# Patient Record
Sex: MALE | Race: WHITE | Employment: OTHER | URBAN - METROPOLITAN AREA
[De-identification: names, ages, dates, MRNs, and addresses within clinical notes are randomized per-mention and may not be internally consistent; named-entity substitution may affect disease eponyms.]

---

## 2017-01-01 ENCOUNTER — APPOINTMENT (OUTPATIENT)
Dept: GENERAL RADIOLOGY | Age: 77
End: 2017-01-01
Attending: EMERGENCY MEDICINE
Payer: MEDICARE

## 2017-01-01 ENCOUNTER — HOSPITAL ENCOUNTER (OUTPATIENT)
Dept: LAB | Age: 77
Discharge: HOME | End: 2017-09-14

## 2017-01-01 ENCOUNTER — HOME CARE VISIT (OUTPATIENT)
Dept: SCHEDULING | Facility: HOME HEALTH | Age: 77
End: 2017-01-01
Payer: MEDICARE

## 2017-01-01 ENCOUNTER — HOME HEALTH ADMISSION (OUTPATIENT)
Dept: HOME HEALTH SERVICES | Facility: HOME HEALTH | Age: 77
End: 2017-01-01
Payer: MEDICARE

## 2017-01-01 ENCOUNTER — HOSPITAL ENCOUNTER (OUTPATIENT)
Dept: WOUND CARE | Age: 77
Discharge: HOME | End: 2017-10-09
Payer: COMMERCIAL

## 2017-01-01 ENCOUNTER — HOSPITAL ENCOUNTER (EMERGENCY)
Age: 77
End: 2017-12-15
Attending: EMERGENCY MEDICINE
Payer: MEDICARE

## 2017-01-01 ENCOUNTER — HOSPITAL ENCOUNTER (EMERGENCY)
Age: 77
Discharge: HOME | End: 2017-12-13
Attending: EMERGENCY MEDICINE
Payer: MEDICARE

## 2017-01-01 ENCOUNTER — CLINICAL SUPPORT (OUTPATIENT)
Dept: CARDIOLOGY CLINIC | Age: 77
End: 2017-01-01

## 2017-01-01 ENCOUNTER — TELEPHONE (OUTPATIENT)
Dept: CARDIOLOGY CLINIC | Age: 77
End: 2017-01-01

## 2017-01-01 ENCOUNTER — APPOINTMENT (OUTPATIENT)
Dept: CT IMAGING | Age: 77
End: 2017-01-01
Attending: EMERGENCY MEDICINE
Payer: MEDICARE

## 2017-01-01 ENCOUNTER — OFFICE VISIT (OUTPATIENT)
Dept: CARDIOLOGY CLINIC | Age: 77
End: 2017-01-01

## 2017-01-01 ENCOUNTER — HOME CARE VISIT (OUTPATIENT)
Dept: HOME HEALTH SERVICES | Facility: HOME HEALTH | Age: 77
End: 2017-01-01
Payer: MEDICARE

## 2017-01-01 ENCOUNTER — HOSPITAL ENCOUNTER (EMERGENCY)
Age: 77
Discharge: HOME | End: 2017-09-09
Attending: EMERGENCY MEDICINE
Payer: COMMERCIAL

## 2017-01-01 ENCOUNTER — APPOINTMENT (OUTPATIENT)
Dept: CT IMAGING | Age: 77
End: 2017-01-01
Attending: NURSE PRACTITIONER
Payer: COMMERCIAL

## 2017-01-01 ENCOUNTER — HOSPITAL ENCOUNTER (EMERGENCY)
Age: 77
Discharge: HOME | End: 2017-12-06
Attending: EMERGENCY MEDICINE
Payer: MEDICARE

## 2017-01-01 ENCOUNTER — HOSPITAL ENCOUNTER (OUTPATIENT)
Dept: WOUND CARE | Age: 77
Discharge: HOME | End: 2017-10-23
Payer: COMMERCIAL

## 2017-01-01 ENCOUNTER — HOSPITAL ENCOUNTER (OUTPATIENT)
Dept: WOUND CARE | Age: 77
Discharge: HOME | End: 2017-10-30
Payer: COMMERCIAL

## 2017-01-01 ENCOUNTER — HOSPITAL ENCOUNTER (OUTPATIENT)
Dept: WOUND CARE | Age: 77
Discharge: HOME | End: 2017-10-02
Payer: COMMERCIAL

## 2017-01-01 VITALS — HEART RATE: 33 BPM | BODY MASS INDEX: 30.41 KG/M2 | WEIGHT: 200 LBS

## 2017-01-01 VITALS
SYSTOLIC BLOOD PRESSURE: 148 MMHG | RESPIRATION RATE: 17 BRPM | OXYGEN SATURATION: 97 % | TEMPERATURE: 98.1 F | DIASTOLIC BLOOD PRESSURE: 70 MMHG | HEART RATE: 91 BPM

## 2017-01-01 VITALS
SYSTOLIC BLOOD PRESSURE: 140 MMHG | HEART RATE: 70 BPM | OXYGEN SATURATION: 97 % | RESPIRATION RATE: 18 BRPM | TEMPERATURE: 97.3 F | DIASTOLIC BLOOD PRESSURE: 60 MMHG

## 2017-01-01 VITALS
WEIGHT: 200 LBS | OXYGEN SATURATION: 95 % | TEMPERATURE: 97.8 F | RESPIRATION RATE: 14 BRPM | DIASTOLIC BLOOD PRESSURE: 80 MMHG | SYSTOLIC BLOOD PRESSURE: 172 MMHG | HEIGHT: 68 IN | BODY MASS INDEX: 30.31 KG/M2 | HEART RATE: 67 BPM

## 2017-01-01 VITALS
DIASTOLIC BLOOD PRESSURE: 72 MMHG | WEIGHT: 225 LBS | HEIGHT: 67 IN | HEART RATE: 72 BPM | SYSTOLIC BLOOD PRESSURE: 158 MMHG | RESPIRATION RATE: 18 BRPM | OXYGEN SATURATION: 94 % | BODY MASS INDEX: 35.31 KG/M2

## 2017-01-01 VITALS
RESPIRATION RATE: 18 BRPM | HEART RATE: 74 BPM | DIASTOLIC BLOOD PRESSURE: 90 MMHG | OXYGEN SATURATION: 96 % | TEMPERATURE: 96.7 F | SYSTOLIC BLOOD PRESSURE: 160 MMHG

## 2017-01-01 VITALS
RESPIRATION RATE: 18 BRPM | DIASTOLIC BLOOD PRESSURE: 91 MMHG | SYSTOLIC BLOOD PRESSURE: 145 MMHG | HEART RATE: 69 BPM | RESPIRATION RATE: 17 BRPM | BODY MASS INDEX: 36.1 KG/M2 | HEIGHT: 67 IN | OXYGEN SATURATION: 95 % | OXYGEN SATURATION: 97 % | WEIGHT: 230 LBS | TEMPERATURE: 98 F | HEART RATE: 70 BPM | TEMPERATURE: 98 F | SYSTOLIC BLOOD PRESSURE: 140 MMHG | DIASTOLIC BLOOD PRESSURE: 80 MMHG

## 2017-01-01 VITALS
SYSTOLIC BLOOD PRESSURE: 140 MMHG | DIASTOLIC BLOOD PRESSURE: 60 MMHG | OXYGEN SATURATION: 97 % | TEMPERATURE: 97.3 F | HEART RATE: 70 BPM | RESPIRATION RATE: 17 BRPM

## 2017-01-01 VITALS
OXYGEN SATURATION: 96 % | TEMPERATURE: 98.6 F | DIASTOLIC BLOOD PRESSURE: 95 MMHG | BODY MASS INDEX: 35.36 KG/M2 | WEIGHT: 220 LBS | RESPIRATION RATE: 16 BRPM | HEIGHT: 66 IN | SYSTOLIC BLOOD PRESSURE: 148 MMHG | HEART RATE: 73 BPM

## 2017-01-01 VITALS
DIASTOLIC BLOOD PRESSURE: 80 MMHG | HEART RATE: 67 BPM | SYSTOLIC BLOOD PRESSURE: 128 MMHG | TEMPERATURE: 98 F | OXYGEN SATURATION: 96 % | RESPIRATION RATE: 18 BRPM

## 2017-01-01 VITALS
DIASTOLIC BLOOD PRESSURE: 70 MMHG | OXYGEN SATURATION: 97 % | SYSTOLIC BLOOD PRESSURE: 148 MMHG | TEMPERATURE: 98.1 F | HEART RATE: 91 BPM

## 2017-01-01 VITALS
DIASTOLIC BLOOD PRESSURE: 82 MMHG | OXYGEN SATURATION: 96 % | SYSTOLIC BLOOD PRESSURE: 140 MMHG | HEART RATE: 70 BPM | TEMPERATURE: 97.4 F | RESPIRATION RATE: 18 BRPM

## 2017-01-01 DIAGNOSIS — H34.11 CENTRAL ARTERY OCCLUSION OF RETINA, RIGHT: ICD-10-CM

## 2017-01-01 DIAGNOSIS — I10 ESSENTIAL HYPERTENSION: ICD-10-CM

## 2017-01-01 DIAGNOSIS — K59.03 DRUG-INDUCED CONSTIPATION: ICD-10-CM

## 2017-01-01 DIAGNOSIS — G47.33 OSA (OBSTRUCTIVE SLEEP APNEA): ICD-10-CM

## 2017-01-01 DIAGNOSIS — H34.11 CENTRAL ARTERY OCCLUSION OF RETINA, RIGHT: Primary | ICD-10-CM

## 2017-01-01 DIAGNOSIS — G89.29 CHRONIC BILATERAL LOW BACK PAIN WITH RIGHT-SIDED SCIATICA: Primary | ICD-10-CM

## 2017-01-01 DIAGNOSIS — E78.5 DYSLIPIDEMIA: ICD-10-CM

## 2017-01-01 DIAGNOSIS — R79.89 ELEVATED SERUM CREATININE: ICD-10-CM

## 2017-01-01 DIAGNOSIS — E11.22 TYPE 2 DIABETES MELLITUS WITH STAGE 3 CHRONIC KIDNEY DISEASE, WITHOUT LONG-TERM CURRENT USE OF INSULIN (HCC): ICD-10-CM

## 2017-01-01 DIAGNOSIS — J44.9 CHRONIC OBSTRUCTIVE PULMONARY DISEASE, UNSPECIFIED COPD TYPE (HCC): ICD-10-CM

## 2017-01-01 DIAGNOSIS — N18.30 CKD (CHRONIC KIDNEY DISEASE), STAGE III (HCC): ICD-10-CM

## 2017-01-01 DIAGNOSIS — N18.30 TYPE 2 DIABETES MELLITUS WITH STAGE 3 CHRONIC KIDNEY DISEASE, WITHOUT LONG-TERM CURRENT USE OF INSULIN (HCC): ICD-10-CM

## 2017-01-01 DIAGNOSIS — M54.50 BILATERAL LOW BACK PAIN WITHOUT SCIATICA, UNSPECIFIED CHRONICITY: Primary | ICD-10-CM

## 2017-01-01 DIAGNOSIS — I87.2 VENOUS INSUFFICIENCY: ICD-10-CM

## 2017-01-01 DIAGNOSIS — H54.61 VISUAL LOSS, RIGHT EYE: Primary | ICD-10-CM

## 2017-01-01 DIAGNOSIS — N18.9 CKD (CHRONIC KIDNEY DISEASE), UNSPECIFIED STAGE: ICD-10-CM

## 2017-01-01 DIAGNOSIS — I46.9 CARDIAC ARREST (HCC): Primary | ICD-10-CM

## 2017-01-01 DIAGNOSIS — R31.29 MICROSCOPIC HEMATURIA: ICD-10-CM

## 2017-01-01 DIAGNOSIS — M54.41 CHRONIC BILATERAL LOW BACK PAIN WITH RIGHT-SIDED SCIATICA: Primary | ICD-10-CM

## 2017-01-01 LAB
ALBUMIN SERPL-MCNC: 2.9 G/DL (ref 3.5–5)
ALBUMIN SERPL-MCNC: 3.5 G/DL (ref 3.5–5)
ALBUMIN/GLOB SERPL: 0.9 {RATIO} (ref 1.1–2.2)
ALBUMIN/GLOB SERPL: 1.1 {RATIO} (ref 1.1–2.2)
ALP SERPL-CCNC: 89 U/L (ref 45–117)
ALP SERPL-CCNC: 91 U/L (ref 45–117)
ALT SERPL-CCNC: 20 U/L (ref 12–78)
ALT SERPL-CCNC: 23 U/L (ref 12–78)
ANION GAP BLD CALC-SCNC: 21 MMOL/L (ref 5–15)
ANION GAP SERPL CALC-SCNC: 10 MMOL/L (ref 5–15)
ANION GAP SERPL CALC-SCNC: 6 MMOL/L (ref 5–15)
ANION GAP SERPL CALC-SCNC: 9 MMOL/L (ref 5–15)
APPEARANCE UR: ABNORMAL
AST SERPL-CCNC: 12 U/L (ref 15–37)
AST SERPL-CCNC: 19 U/L (ref 15–37)
BACTERIA URNS QL MICRO: NEGATIVE /HPF
BASOPHILS # BLD: 0 K/UL (ref 0–0.1)
BASOPHILS # BLD: 0 K/UL (ref 0–0.1)
BASOPHILS NFR BLD: 0 % (ref 0–1)
BASOPHILS NFR BLD: 0 % (ref 0–1)
BILIRUB SERPL-MCNC: 0.3 MG/DL (ref 0.2–1)
BILIRUB SERPL-MCNC: 0.4 MG/DL (ref 0.2–1)
BILIRUB UR QL: NEGATIVE
BUN BLD-MCNC: 59 MG/DL (ref 9–20)
BUN SERPL-MCNC: 45 MG/DL (ref 6–20)
BUN SERPL-MCNC: 49 MG/DL (ref 6–20)
BUN SERPL-MCNC: 51 MG/DL (ref 6–20)
BUN/CREAT SERPL: 13 (ref 12–20)
BUN/CREAT SERPL: 14 (ref 12–20)
BUN/CREAT SERPL: 14 (ref 12–20)
CA-I BLD-MCNC: 1.19 MMOL/L (ref 1.12–1.32)
CALCIUM SERPL-MCNC: 9.1 MG/DL (ref 8.5–10.1)
CALCIUM SERPL-MCNC: 9.2 MG/DL (ref 8.5–10.1)
CALCIUM SERPL-MCNC: 9.4 MG/DL (ref 8.5–10.1)
CHLORIDE BLD-SCNC: 104 MMOL/L (ref 98–107)
CHLORIDE SERPL-SCNC: 104 MMOL/L (ref 97–108)
CHLORIDE SERPL-SCNC: 105 MMOL/L (ref 97–108)
CHLORIDE SERPL-SCNC: 106 MMOL/L (ref 97–108)
CO2 BLD-SCNC: 19 MMOL/L (ref 21–32)
CO2 SERPL-SCNC: 26 MMOL/L (ref 21–32)
CO2 SERPL-SCNC: 27 MMOL/L (ref 21–32)
CO2 SERPL-SCNC: 28 MMOL/L (ref 21–32)
COLOR UR: ABNORMAL
CREAT BLD-MCNC: 3.9 MG/DL (ref 0.6–1.3)
CREAT SERPL-MCNC: 3.23 MG/DL (ref 0.7–1.3)
CREAT SERPL-MCNC: 3.59 MG/DL (ref 0.7–1.3)
CREAT SERPL-MCNC: 3.82 MG/DL (ref 0.7–1.3)
DIFFERENTIAL METHOD BLD: ABNORMAL
EOSINOPHIL # BLD: 0.2 K/UL (ref 0–0.4)
EOSINOPHIL # BLD: 0.3 K/UL (ref 0–0.4)
EOSINOPHIL NFR BLD: 3 % (ref 0–7)
EOSINOPHIL NFR BLD: 4 % (ref 0–7)
EPITH CASTS URNS QL MICRO: ABNORMAL /LPF
ERYTHROCYTE [DISTWIDTH] IN BLOOD BY AUTOMATED COUNT: 14.6 % (ref 11.5–14.5)
ERYTHROCYTE [DISTWIDTH] IN BLOOD BY AUTOMATED COUNT: 14.9 % (ref 11.5–14.5)
ERYTHROCYTE [SEDIMENTATION RATE] IN BLOOD: 49 MM/HR (ref 0–20)
GLOBULIN SER CALC-MCNC: 3.2 G/DL (ref 2–4)
GLOBULIN SER CALC-MCNC: 3.4 G/DL (ref 2–4)
GLUCOSE BLD-MCNC: 275 MG/DL (ref 65–100)
GLUCOSE SERPL-MCNC: 100 MG/DL (ref 65–100)
GLUCOSE SERPL-MCNC: 113 MG/DL (ref 65–100)
GLUCOSE SERPL-MCNC: 80 MG/DL (ref 65–100)
GLUCOSE UR STRIP.AUTO-MCNC: NEGATIVE MG/DL
HCT VFR BLD AUTO: 25.4 % (ref 36.6–50.3)
HCT VFR BLD AUTO: 32.2 % (ref 36.6–50.3)
HCT VFR BLD CALC: 24 % (ref 36.6–50.3)
HGB BLD-MCNC: 10.2 G/DL (ref 12.1–17)
HGB BLD-MCNC: 8.2 G/DL (ref 12.1–17)
HGB BLD-MCNC: 8.2 GM/DL (ref 12.1–17)
HGB UR QL STRIP: ABNORMAL
HYALINE CASTS URNS QL MICRO: ABNORMAL /LPF (ref 0–5)
KETONES UR QL STRIP.AUTO: NEGATIVE MG/DL
LEUKOCYTE ESTERASE UR QL STRIP.AUTO: NEGATIVE
LYMPHOCYTES # BLD: 0.4 K/UL (ref 0.8–3.5)
LYMPHOCYTES # BLD: 0.7 K/UL (ref 0.8–3.5)
LYMPHOCYTES NFR BLD: 10 % (ref 12–49)
LYMPHOCYTES NFR BLD: 6 % (ref 12–49)
MCH RBC QN AUTO: 27.4 PG (ref 26–34)
MCH RBC QN AUTO: 27.9 PG (ref 26–34)
MCHC RBC AUTO-ENTMCNC: 31.7 G/DL (ref 30–36.5)
MCHC RBC AUTO-ENTMCNC: 32.3 G/DL (ref 30–36.5)
MCV RBC AUTO: 86.4 FL (ref 80–99)
MCV RBC AUTO: 86.6 FL (ref 80–99)
MONOCYTES # BLD: 0.5 K/UL (ref 0–1)
MONOCYTES # BLD: 0.6 K/UL (ref 0–1)
MONOCYTES NFR BLD: 7 % (ref 5–13)
MONOCYTES NFR BLD: 8 % (ref 5–13)
NEUTS SEG # BLD: 5.7 K/UL (ref 1.8–8)
NEUTS SEG # BLD: 6 K/UL (ref 1.8–8)
NEUTS SEG NFR BLD: 79 % (ref 32–75)
NEUTS SEG NFR BLD: 83 % (ref 32–75)
NITRITE UR QL STRIP.AUTO: NEGATIVE
PH UR STRIP: 6 [PH] (ref 5–8)
PLATELET # BLD AUTO: 124 K/UL (ref 150–400)
PLATELET # BLD AUTO: 129 K/UL (ref 150–400)
POTASSIUM BLD-SCNC: 4.7 MMOL/L (ref 3.5–5.1)
POTASSIUM SERPL-SCNC: 4 MMOL/L (ref 3.5–5.1)
POTASSIUM SERPL-SCNC: 4.1 MMOL/L (ref 3.5–5.1)
POTASSIUM SERPL-SCNC: 4.4 MMOL/L (ref 3.5–5.1)
PROT SERPL-MCNC: 6.3 G/DL (ref 6.4–8.2)
PROT SERPL-MCNC: 6.7 G/DL (ref 6.4–8.2)
PROT UR STRIP-MCNC: 30 MG/DL
RBC # BLD AUTO: 2.94 M/UL (ref 4.1–5.7)
RBC # BLD AUTO: 3.72 M/UL (ref 4.1–5.7)
RBC #/AREA URNS HPF: ABNORMAL /HPF (ref 0–5)
RBC MORPH BLD: ABNORMAL
RBC MORPH BLD: ABNORMAL
SERVICE CMNT-IMP: ABNORMAL
SODIUM BLD-SCNC: 138 MMOL/L (ref 136–145)
SODIUM SERPL-SCNC: 140 MMOL/L (ref 136–145)
SODIUM SERPL-SCNC: 140 MMOL/L (ref 136–145)
SODIUM SERPL-SCNC: 141 MMOL/L (ref 136–145)
SP GR UR REFRACTOMETRY: 1.01 (ref 1–1.03)
TROPONIN I BLD-MCNC: <0.04 NG/ML (ref 0–0.08)
UR CULT HOLD, URHOLD: NORMAL
UROBILINOGEN UR QL STRIP.AUTO: 0.2 EU/DL (ref 0.2–1)
WBC # BLD AUTO: 7.2 K/UL (ref 4.1–11.1)
WBC # BLD AUTO: 7.2 K/UL (ref 4.1–11.1)
WBC URNS QL MICRO: ABNORMAL /HPF (ref 0–4)

## 2017-01-01 PROCEDURE — 74000 XR ABD (KUB): CPT

## 2017-01-01 PROCEDURE — G0151 HHCP-SERV OF PT,EA 15 MIN: HCPCS

## 2017-01-01 PROCEDURE — 99285 EMERGENCY DEPT VISIT HI MDM: CPT

## 2017-01-01 PROCEDURE — 36415 COLL VENOUS BLD VENIPUNCTURE: CPT | Performed by: EMERGENCY MEDICINE

## 2017-01-01 PROCEDURE — 11042 DBRDMT SUBQ TIS 1ST 20SQCM/<: CPT

## 2017-01-01 PROCEDURE — G0156 HHCP-SVS OF AIDE,EA 15 MIN: HCPCS

## 2017-01-01 PROCEDURE — 99213 OFFICE O/P EST LOW 20 MIN: CPT

## 2017-01-01 PROCEDURE — 70450 CT HEAD/BRAIN W/O DYE: CPT

## 2017-01-01 PROCEDURE — 74176 CT ABD & PELVIS W/O CONTRAST: CPT

## 2017-01-01 PROCEDURE — 96372 THER/PROPH/DIAG INJ SC/IM: CPT

## 2017-01-01 PROCEDURE — 400013 HH SOC

## 2017-01-01 PROCEDURE — 84484 ASSAY OF TROPONIN QUANT: CPT

## 2017-01-01 PROCEDURE — 74011250636 HC RX REV CODE- 250/636: Performed by: EMERGENCY MEDICINE

## 2017-01-01 PROCEDURE — 96360 HYDRATION IV INFUSION INIT: CPT

## 2017-01-01 PROCEDURE — 80053 COMPREHEN METABOLIC PANEL: CPT | Performed by: EMERGENCY MEDICINE

## 2017-01-01 PROCEDURE — 92950 HEART/LUNG RESUSCITATION CPR: CPT

## 2017-01-01 PROCEDURE — 85652 RBC SED RATE AUTOMATED: CPT | Performed by: EMERGENCY MEDICINE

## 2017-01-01 PROCEDURE — 85025 COMPLETE CBC W/AUTO DIFF WBC: CPT | Performed by: EMERGENCY MEDICINE

## 2017-01-01 PROCEDURE — G0152 HHCP-SERV OF OT,EA 15 MIN: HCPCS

## 2017-01-01 PROCEDURE — 93880 EXTRACRANIAL BILAT STUDY: CPT

## 2017-01-01 PROCEDURE — 99202 OFFICE O/P NEW SF 15 MIN: CPT

## 2017-01-01 PROCEDURE — 80047 BASIC METABLC PNL IONIZED CA: CPT

## 2017-01-01 PROCEDURE — 74011250637 HC RX REV CODE- 250/637: Performed by: EMERGENCY MEDICINE

## 2017-01-01 PROCEDURE — 97597 DBRDMT OPN WND 1ST 20 CM/<: CPT

## 2017-01-01 PROCEDURE — 99284 EMERGENCY DEPT VISIT MOD MDM: CPT

## 2017-01-01 PROCEDURE — 99283 EMERGENCY DEPT VISIT LOW MDM: CPT

## 2017-01-01 PROCEDURE — 81001 URINALYSIS AUTO W/SCOPE: CPT | Performed by: EMERGENCY MEDICINE

## 2017-01-01 PROCEDURE — 96361 HYDRATE IV INFUSION ADD-ON: CPT

## 2017-01-01 PROCEDURE — 74011000250 HC RX REV CODE- 250: Performed by: EMERGENCY MEDICINE

## 2017-01-01 PROCEDURE — 80048 BASIC METABOLIC PNL TOTAL CA: CPT | Performed by: EMERGENCY MEDICINE

## 2017-01-01 RX ORDER — MAGNESIUM CITRATE
296 SOLUTION, ORAL ORAL ONCE
Qty: 1 BOTTLE | Refills: 0 | Status: SHIPPED | OUTPATIENT
Start: 2017-01-01 | End: 2017-01-01

## 2017-01-01 RX ORDER — HYDROMORPHONE HYDROCHLORIDE 1 MG/ML
1 INJECTION, SOLUTION INTRAMUSCULAR; INTRAVENOUS; SUBCUTANEOUS
Status: COMPLETED | OUTPATIENT
Start: 2017-01-01 | End: 2017-01-01

## 2017-01-01 RX ORDER — PANTOPRAZOLE SODIUM 40 MG/1
40 TABLET, DELAYED RELEASE ORAL DAILY
COMMUNITY

## 2017-01-01 RX ORDER — SODIUM BICARBONATE 1 MEQ/ML
SYRINGE (ML) INTRAVENOUS
Status: COMPLETED | OUTPATIENT
Start: 2017-01-01 | End: 2017-01-01

## 2017-01-01 RX ORDER — ACETAMINOPHEN 500 MG
1000 TABLET ORAL ONCE
Status: COMPLETED | OUTPATIENT
Start: 2017-01-01 | End: 2017-01-01

## 2017-01-01 RX ORDER — EPINEPHRINE 0.1 MG/ML
INJECTION INTRACARDIAC; INTRAVENOUS
Status: COMPLETED | OUTPATIENT
Start: 2017-01-01 | End: 2017-01-01

## 2017-01-01 RX ORDER — HYDROCODONE BITARTRATE 10 MG/1
CAPSULE, EXTENDED RELEASE ORAL AS NEEDED
COMMUNITY
End: 2017-12-26 | Stop reason: SDUPTHER

## 2017-01-01 RX ORDER — LIDOCAINE HYDROCHLORIDE 20 MG/ML
JELLY TOPICAL ONCE
Status: COMPLETED | OUTPATIENT
Start: 2017-01-01 | End: 2017-01-01

## 2017-01-01 RX ORDER — CHLORTHALIDONE 25 MG/1
12.5 TABLET ORAL DAILY
COMMUNITY

## 2017-01-01 RX ORDER — LIDOCAINE HYDROCHLORIDE 20 MG/ML
JELLY TOPICAL
Status: COMPLETED | OUTPATIENT
Start: 2017-01-01 | End: 2017-01-01

## 2017-01-01 RX ORDER — CYCLOBENZAPRINE HCL 10 MG
TABLET ORAL
COMMUNITY

## 2017-01-01 RX ORDER — FLUTICASONE PROPIONATE AND SALMETEROL 250; 50 UG/1; UG/1
1 POWDER RESPIRATORY (INHALATION) EVERY 12 HOURS
COMMUNITY

## 2017-01-01 RX ORDER — MONTELUKAST SODIUM 10 MG/1
10 TABLET ORAL DAILY
COMMUNITY

## 2017-01-01 RX ORDER — ALBUTEROL SULFATE 90 UG/1
AEROSOL, METERED RESPIRATORY (INHALATION)
COMMUNITY
End: 2017-01-01

## 2017-01-01 RX ORDER — PREDNISONE 20 MG/1
80 TABLET ORAL DAILY
COMMUNITY
End: 2017-01-01

## 2017-01-01 RX ORDER — POLYETHYLENE GLYCOL 3350 17 G/17G
17 POWDER, FOR SOLUTION ORAL 2 TIMES DAILY
Qty: 238 G | Refills: 0 | Status: SHIPPED | OUTPATIENT
Start: 2017-01-01 | End: 2017-01-01

## 2017-01-01 RX ORDER — LANOLIN ALCOHOL/MO/W.PET/CERES
1000 CREAM (GRAM) TOPICAL DAILY
COMMUNITY

## 2017-01-01 RX ORDER — FISH OIL/DHA/EPA 1200-144MG
CAPSULE ORAL
COMMUNITY
End: 2017-12-26 | Stop reason: SDUPTHER

## 2017-01-01 RX ORDER — CYCLOBENZAPRINE HCL 10 MG
10 TABLET ORAL
Status: COMPLETED | OUTPATIENT
Start: 2017-01-01 | End: 2017-01-01

## 2017-01-01 RX ORDER — RANITIDINE 150 MG/1
150 TABLET, FILM COATED ORAL DAILY
COMMUNITY
End: 2017-01-01

## 2017-01-01 RX ADMIN — ACETAMINOPHEN 1000 MG: 500 TABLET ORAL at 12:52

## 2017-01-01 RX ADMIN — LIDOCAINE HYDROCHLORIDE: 20 JELLY TOPICAL at 14:31

## 2017-01-01 RX ADMIN — LIDOCAINE HYDROCHLORIDE: 20 JELLY TOPICAL at 14:45

## 2017-01-01 RX ADMIN — EPINEPHRINE 1 MG: 0.1 INJECTION, SOLUTION ENDOTRACHEAL; INTRACARDIAC; INTRAVENOUS at 14:22

## 2017-01-01 RX ADMIN — CYCLOBENZAPRINE HYDROCHLORIDE 10 MG: 10 TABLET, FILM COATED ORAL at 12:55

## 2017-01-01 RX ADMIN — HYDROMORPHONE HYDROCHLORIDE 1 MG: 1 INJECTION, SOLUTION INTRAMUSCULAR; INTRAVENOUS; SUBCUTANEOUS at 12:02

## 2017-01-01 RX ADMIN — SODIUM CHLORIDE 1000 ML: 900 INJECTION, SOLUTION INTRAVENOUS at 13:00

## 2017-01-01 RX ADMIN — SODIUM BICARBONATE 50 MEQ: 84 INJECTION INTRAVENOUS at 14:23

## 2017-01-01 RX ADMIN — EPINEPHRINE 1 MG: 0.1 INJECTION, SOLUTION ENDOTRACHEAL; INTRACARDIAC; INTRAVENOUS at 14:19

## 2017-09-09 NOTE — ED NOTES
Patient discharged by MD. Papers given and questions answered. Home with family. I have reviewed discharge instructions with the patient and spouse. The patient and spouse verbalized understanding.

## 2017-09-09 NOTE — DISCHARGE INSTRUCTIONS
Chronic Kidney Disease: Care Instructions  Your Care Instructions  Chronic kidney disease happens when your kidneys don't work as well as they should. Your kidneys have a few important jobs. They remove waste from your blood. This waste leaves your body in your urine. They also balance your body's fluids and chemicals. When your kidneys don't work well, extra waste and fluid can build up. This can poison the body and sometimes cause death. The most common causes of this disease are diabetes and high blood pressure. In some cases, the disease develops in 2 to 3 months. But it usually develops over many years. If you take medicine and make healthy changes to your lifestyle, you may be able to prevent the disease from getting worse. But if your kidney damage gets worse, you may need dialysis or a kidney transplant. Dialysis uses a machine to filter waste from the blood. A transplant is surgery to give you a healthy kidney from another person. Follow-up care is a key part of your treatment and safety. Be sure to make and go to all appointments, and call your doctor if you are having problems. It's also a good idea to know your test results and keep a list of the medicines you take. How can you care for yourself at home? Treatments and appointments  · Be safe with medicines. Take your medicines exactly as prescribed. Call your doctor if you have any problems with your medicine. You also may take medicine to control your blood pressure or to treat diabetes. Many people who have diabetes take blood pressure medicine. · If you have diabetes, do your best to keep your blood sugar in your target range. You may do this by eating healthy food and exercising. You may also take medicines. · Go to your dialysis appointments if you have this treatment. · Do not take ibuprofen (Advil, Motrin), naproxen (Aleve), or similar medicines, unless your doctor tells you to. These may make the disease worse.   · Do not take any vitamins, over-the-counter medicines, or herbal products without talking to your doctor first.  · Do not smoke or use other tobacco products. Smoking can reduce blood flow to the kidneys. If you need help quitting, talk to your doctor about stop-smoking programs and medicines. These can increase your chances of quitting for good. · Do not drink alcohol or use illegal drugs. · Talk to your doctor about an exercise plan. Exercise helps lower your blood pressure. It also makes you feel better. · If you have an advance directive, let your doctor know. It may include a living will and a durable power of  for health care. If you don't have one, you may want to prepare one. It lets your doctor and loved ones know your health care wishes if you become unable to speak for yourself. Diet  · Talk to a registered dietitian. He or she can help you make a meal plan that is right for you. Most people with kidney disease need to limit salt (sodium), fluids, and protein. Some also have to limit potassium and phosphorus. · You may have to give up many foods you like. But try to focus on the fact that this will help you stay healthy for as long as possible. · If you have a hard time eating enough, talk to your doctor or dietitian about ways to add calories to your diet. · Your diet may change as your disease changes. See your doctor for regular testing. And work with a dietitian to change your diet as needed. When should you call for help? Call 911 anytime you think you may need emergency care. For example, call if:  · You passed out (lost consciousness). Call your doctor now or seek immediate medical care if:  · You have less urine than normal or no urine. · You have trouble urinating or can urinate only very small amounts. · You are confused or have trouble thinking clearly. · You feel weaker or more tired than usual.  · You are very thirsty, lightheaded, or dizzy. · You have nausea and vomiting.   · You have new swelling of your arms or feet, or your swelling is worse. · You have blood in your urine. · You have new or worse trouble breathing. Watch closely for changes in your health, and be sure to contact your doctor if:  · You have any problems with your medicine or other treatment. Where can you learn more? Go to http://roque-shanelle.info/. Enter N276 in the search box to learn more about \"Chronic Kidney Disease: Care Instructions. \"  Current as of: April 3, 2017  Content Version: 11.3  © 2758-5710 [employer]. Care instructions adapted under license by [employer] (which disclaims liability or warranty for this information). If you have questions about a medical condition or this instruction, always ask your healthcare professional. [address] any warranty or liability for your use of this information.

## 2017-09-09 NOTE — ED TRIAGE NOTES
Pt was sent by eye doctor at [facility] [place] to get SED rate drawn. Pt has lost some R eye vision at 1pm today and eye doctor is concerned about pt;'s lab values.     DX: temporal arteritis per MD

## 2017-09-09 NOTE — ED NOTES
PT returned from CT.   Updated to status/  Pt is frustrated that he may not have answers to why he lost his vision

## 2017-09-09 NOTE — ED NOTES
Verbal shift change report given to sunni rn (oncoming nurse) by mane rn (offgoing nurse). Report included the following information SBAR.

## 2017-09-09 NOTE — PROCEDURES
Benjamintine Pleasure  *** FINAL REPORT ***    Name: Fam Alvarez  MRN: [id number]  : 10 May 1940  HIS Order #: [id number]  [id number] [facility] [employer] Visit #: [id number]  Date: 09 Sep 2017    TYPE OF TEST: Cerebrovascular Duplex    REASON FOR TEST  Sudden visual loss    Right Carotid:-             Proximal               Mid                 Distal  cm/s  Systolic  Diastolic  Systolic  Diastolic  Systolic  Diastolic  CCA:     86.0       6.3                            41.6      10.9  Bulb:  ECA:     82.2      12.8  ICA:    104.8      17.6       51.5      13.1  ICA/CCA:  2.5       1.6    ICA Stenosis: <50%    Right Vertebral:-  Finding:  Sys:  Funmi:    Right Subclavian:    Left Carotid:-            Proximal                Mid                 Distal  cm/s  Systolic  Diastolic  Systolic  Diastolic  Systolic  Diastolic  CCA:     17.9       8.7                            57.0      12.0  Bulb:  ECA:     61.4       8.7  ICA:     60.3      14.2       48.2      10.9       41.2       9.8  ICA/CCA:  1.1       1.2    ICA Stenosis: <50%    Left Vertebral:-  Finding:  Sys:  Funmi:    Left Subclavian:    INTERPRETATION/FINDINGS  PROCEDURE:  Evaluation of the extracranial cerebrovascular arteries  with ultrasound (B-mode imaging, pulsed Doppler, color Doppler). Includes the common carotid, internal carotid, external carotid, and  vertebral arteries. FINDINGS:  Heterogeneous plaque in the bulb and bilateral ICA. IMPRESSION: Findings are consistent with 0-49% stenosis of the right  internal carotid and 0-49% stenosis of the left internal carotid. Unable to evlaute bilateral vertebral vessels due to body habitus. ADDITIONAL COMMENTS    I have personally reviewed the data relevant to the interpretation of  this  study. TECHNOLOGIST: SHAYNE Ospina  Signed: 2017 07:05 PM    PHYSICIAN: Palmira Gonzales.  Jesse Linda MD  Signed: 2017 09:12 AM

## 2017-09-09 NOTE — ED PROVIDER NOTES
HPI Comments: Veronica Smith is a 65 y.o. male with Hx of CKD stage III, peripheral neuropathy, COPD, cardiac arrest 2/2 hypercapnic event with ROSC, HTN, HLD, DJD, BPH, CAD who presents via [facility] with his wife to [facility] ED with cc of R eye vision loss. Pt states that he was sitting in his chair watching TV and drinking coffee when he lost vision in his R eye. He states he sees a small sliver \"like a triangle\" and feels he is looking through glass in that region on the lower R/ portion of his eye. Pt is followed by [facility] and went to see Dr. Leticia Cadena today. He had an eye exam and only notable findings was an afferent pupillary defect which was concerning for optic nerve dysfunction. He was referred to ED to r/o temporal arteritis as possible etiology with request for an ESR. He denies any HA, scalp pain, fevers,eye pain, chills. He has no transient changes in his vision. Has has hx of optic venous insufficiency per opthamology. He has no hx of CVA/ TIA in the past per pt, he states that he has never been told that he has any vascular disease in the past. He denies hx of DMII, however, medical record states he has DMII. He denies taking any anticoagulation in a daily basis. He has no hx of trauma or injury to his eye. Former tobacco abuse, (-) ETOH/ substance abuse. PCP: Aurelio Bear MD    There are no other complaints, changes or physical findings at this time. The history is provided by the patient.         Past Medical History:   Diagnosis Date    Anxiety and depression     pain related    BPH (benign prostatic hyperplasia)     Cardiac arrest ([facility] [address]     S/p dav-asystolic arrest due to CO2 retention  8/12    COPD (chronic obstructive pulmonary disease) (HCC)     mild    DDD (degenerative disc disease), cervical     DDD (degenerative disc disease), thoracolumbar     DJD (degenerative joint disease)     hips, shoulders    GERD (gastroesophageal reflux disease)     H/O Clostridium difficile infection     History of non-ST elevation myocardial infarction (NSTEMI)     Post op NSTEMI- supply demand-EF nl    Hx of transfusion of whole blood 7/2011    [address], NO REACTION    Hyperlipidemia     Hypertension     NICHOLE (obstructive sleep apnea)     refuses CPAP    Peripheral neuropathy ([facility] [address]     Pneumonia     Unspecified sleep apnea     sleeps with a CPAP    Urinary retention        Past Surgical History:   Procedure Laterality Date    HX ADENOIDECTOMY      AS A CHILD    HX CERVICAL FUSION  7/2011    C2-C3 SCAR CLEAN UP, TERENCE EDDIE    HX CERVICAL LAMINECTOMY  2005    DECOMPRESSIVE CERVIVAL LAMINECTOMY C1-C7    HX GI      COLONOSOCPY    HX HEENT      wisdom teeth    HX HERNIA REPAIR      UMBILICAL    HX LUMBAR LAMINECTOMY  2004    L4-L5 AND DECOMPRESSION L1-S1    HX ORTHOPAEDIC  2004    Thoracic laminectomy    HX ORTHOPAEDIC  7/2011    C1-C2 Decompressive laminectomy     HX ORTHOPAEDIC  2012    hip replacement, right    HX SHOULDER ARTHROSCOPY  10/17/2009    RT. SHOULDER    HX SHOULDER REPLACEMENT  1991, 1993, 2010    bilat shoulderREPLACEMNET 2X EACH SHOULDER    HX THORACIC LAMINECTOMY  2004    T9-T12 W/DECOMPRESSION OF SPINAL CORD    HX TONSILLECTOMY      AS A CHILD         Family History:   Problem Relation Age of Onset    Cancer Mother      PANCREATIC    Hypertension Father     Stroke Father      CREBRAL HEMORRHAGE    Other Brother      paraplegic   [facility] Arthritis-rheumatoid Daughter     Malignant Hyperthermia Neg Hx     Pseudocholinesterase Deficiency Neg Hx     Delayed Awakening Neg Hx     Post-op Nausea/Vomiting Neg Hx     Emergence Delirium Neg Hx     Post-op Cognitive Dysfunction Neg Hx        Social History     Social History    Marital status:      Spouse name: N/A    Number of children: N/A    Years of education: N/A     Occupational History    Not on file.      Social History Main Topics    Smoking status: Former Smoker     Packs/day: 1.00     Quit date: 6/9/1986    Smokeless tobacco: Never Used    Alcohol use No    Drug use: No    Sexual activity: Not on file     Other Topics Concern    Not on file     Social History Narrative         ALLERGIES: Morphine sulfate    Review of Systems   Constitutional: Negative for activity change, appetite change, chills and fever. HENT: Negative for congestion, rhinorrhea, sinus pressure, sneezing and sore throat. Eyes: Positive for visual disturbance. Negative for pain and discharge. Respiratory: Negative for cough and shortness of breath. Cardiovascular: Negative for chest pain. Gastrointestinal: Negative for abdominal pain, diarrhea, nausea and vomiting. Genitourinary: Negative for dysuria, flank pain, frequency and urgency. Musculoskeletal: Negative for arthralgias, back pain, gait problem, joint swelling, myalgias and neck pain. Skin: Negative for color change and rash. Neurological: Negative for dizziness, speech difficulty, weakness, light-headedness, numbness and headaches. Psychiatric/Behavioral: Negative for agitation, behavioral problems and confusion. All other systems reviewed and are negative. Vitals:    09/09/17 1619 09/09/17 1814   BP: 166/79 (!) 145/91   Pulse: 70    Resp: 18    Temp: 98 °F (36.7 °C)    SpO2: 95%    Weight: 104.3 kg (230 lb)    Height: 5' 7\" (1.702 m)             Physical Exam   Constitutional: He is oriented to person, place, and time. He appears well-developed and well-nourished. No distress. HENT:   Head: Normocephalic and atraumatic. Right Ear: External ear normal.   Left Ear: External ear normal.   Nose: Nose normal.   Mouth/Throat: Oropharynx is clear and moist. No oropharyngeal exudate. Eyes: Conjunctivae and EOM are normal. Pupils are equal, round, and reactive to light. ANASTASIA BL, eyes dilated    Neck: Normal range of motion. Neck supple. Cardiovascular: Normal rate, regular rhythm, normal heart sounds and intact distal pulses.     Pulmonary/Chest: Effort normal and breath sounds normal.   Abdominal: Soft. Bowel sounds are normal. There is no tenderness. There is no rebound and no guarding. Musculoskeletal: Normal range of motion. Neurological: He is alert and oriented to person, place, and time. Skin: Skin is warm and dry. Psychiatric: He has a normal mood and affect. His behavior is normal. Judgment and thought content normal.   Nursing note and vitals reviewed. MDM  Number of Diagnoses or Management Options  CKD (chronic kidney disease), unspecified stage:   Elevated serum creatinine:   Visual loss, right eye:   Diagnosis management comments: Ddx; temporal arteritis, TIA vs CVA, amaurosis fugax, carotid stenosis       64 yo M presents with visual loss of R eye. Referred to ED after op[facility]o exam at [facility]. ESR mildly elevated, no s/sx concerning for temporal arteritis. CT Head w/o acute findings and no emergent findings on carotid duplex. No other focal neuro findings. Labs reassuring. Spoke with optho and limited options on any tx plan if no other focal diagnostic findings. SCr elevated, pt has appt with Dr. Dex Lane this week and wife states SCr has been very elevated recently (don't have most recent outpt labs.) Has optho appt on Monday. Advised on reasons to return to ED. Pt was seen/ evaluated/ discussed with attending MD on record who is in agreement. Amount and/or Complexity of Data Reviewed  Clinical lab tests: ordered and reviewed  Tests in the radiology section of CPT®: ordered and reviewed  Review and summarize past medical records: yes  Discuss the patient with other providers: yes      ED Course       Procedures    Per opthamology, Dr. Britni Miner, Afferent pupillary defect noted, concern for vascular event   Ischemic optic neuropathy- micro vasc event- ?  Occlusion with edema- no intervention   Retinal artery event- no intervention-     LABORATORY TESTS:  Recent Results (from the past 12 hour(s))   SED RATE (ESR)    Collection Time: 17  4:31 PM   Result Value Ref Range    Sed rate, automated 49 (H) 0 - 20 mm/hr   CBC WITH AUTOMATED DIFF    Collection Time: 17  4:31 PM   Result Value Ref Range    WBC 7.2 4.1 - 11.1 K/uL    RBC 3.72 (L) 4.10 - 5.70 M/uL    HGB 10.2 (L) 12.1 - 17.0 g/dL    HCT 32.2 (L) 36.6 - 50.3 %    MCV 86.6 80.0 - 99.0 FL    MCH 27.4 26.0 - 34.0 PG    MCHC 31.7 30.0 - 36.5 g/dL    RDW 14.6 (H) 11.5 - 14.5 %    PLATELET 862 (L) 381 - 400 K/uL    NEUTROPHILS 79 (H) 32 - 75 %    LYMPHOCYTES 10 (L) 12 - 49 %    MONOCYTES 7 5 - 13 %    EOSINOPHILS 4 0 - 7 %    BASOPHILS 0 0 - 1 %    ABS. NEUTROPHILS 5.7 1.8 - 8.0 K/UL    ABS. LYMPHOCYTES 0.7 (L) 0.8 - 3.5 K/UL    ABS. MONOCYTES 0.5 0.0 - 1.0 K/UL    ABS. EOSINOPHILS 0.3 0.0 - 0.4 K/UL    ABS. BASOPHILS 0.0 0.0 - 0.1 K/UL    DF SMEAR SCANNED      RBC COMMENTS OVALOCYTES  PRESENT       METABOLIC PANEL, COMPREHENSIVE    Collection Time: 17  4:31 PM   Result Value Ref Range    Sodium 140 136 - 145 mmol/L    Potassium 4.4 3.5 - 5.1 mmol/L    Chloride 106 97 - 108 mmol/L    CO2 28 21 - 32 mmol/L    Anion gap 6 5 - 15 mmol/L    Glucose 113 (H) 65 - 100 mg/dL    BUN 45 (H) 6 - 20 MG/DL    Creatinine 3.23 (H) 0.70 - 1.30 MG/DL    BUN/Creatinine ratio 14 12 - 20      GFR est AA 23 (L) >60 ml/min/1.73m2    GFR est non-AA 19 (L) >60 ml/min/1.73m2    Calcium 9.2 8.5 - 10.1 MG/DL    Bilirubin, total 0.4 0.2 - 1.0 MG/DL    ALT (SGPT) 23 12 - 78 U/L    AST (SGOT) 19 15 - 37 U/L    Alk.  phosphatase 89 45 - 117 U/L    Protein, total 6.7 6.4 - 8.2 g/dL    Albumin 3.5 3.5 - 5.0 g/dL    Globulin 3.2 2.0 - 4.0 g/dL    A-G Ratio 1.1 1.1 - 2.2         IMAGING RESULTS:  DUPLEX CAROTID BILATERAL   Name: Rula Isaacs  MRN: [id number]  : 10 May 1940  HIS Order #: [id number]  [address] [facility] Visit #: [id number]  Date: 09 Sep 2017     TYPE OF TEST: Cerebrovascular Duplex     REASON FOR TEST  Sudden visual loss     Right Carotid:-             Proximal               Mid Distal  cm/s  Systolic  Diastolic  Systolic  Diastolic  Systolic  Diastolic  CCA:     47.8       6.3                            41.6      10.9  Bulb:  ECA:     82.2      12.8  ICA:    104.8      17.6       51.5      13.1  ICA/CCA:  2.5       1.6     ICA Stenosis: <50%     Right Vertebral:-  Finding:  Sys:  Funmi:     Right Subclavian:     Left Carotid:-            Proximal                Mid                 Distal  cm/s  Systolic  Diastolic  Systolic  Diastolic  Systolic  Diastolic  CCA:     89.1       8.7                            57.0      12.0  Bulb:  ECA:     61.4       8.7  ICA:     60.3      14.2       48.2      10.9       41.2       9.8  ICA/CCA:  1.1       1.2     ICA Stenosis: <50%     Left Vertebral:-  Finding:  Sys:  Funmi:     Left Subclavian:     INTERPRETATION/FINDINGS  PROCEDURE:  Evaluation of the extracranial cerebrovascular arteries  with ultrasound (B-mode imaging, pulsed Doppler, color Doppler). Includes the common carotid, internal carotid, external carotid, and  vertebral arteries.     FINDINGS:  Heterogeneous plaque in the bulb and bilateral ICA.     IMPRESSION: Findings are consistent with 0-49% stenosis of the right  internal carotid and 0-49% stenosis of the left internal carotid. Unable to evlaute bilateral vertebral vessels due to body habitus.     ADDITIONAL COMMENTS     I have personally reviewed the data relevant to the interpretation of  this study.     TECHNOLOGIST: SHAYNE Hidalgo  Signed: 09/09/2017 07:05 PM      CT HEAD WO CONT   Final Result   EXAM:  CT HEAD WO CONT     INDICATION:   Visual loss, sudden onset     COMPARISON: 11/10/2012.     CONTRAST:  None.     TECHNIQUE: Unenhanced CT of the head was performed using 5 mm images. Brain and  bone windows were generated.   CT dose reduction was achieved through use of a  standardized protocol tailored for this examination and automatic exposure  control for dose modulation.       FINDINGS:  The ventricles and sulci are normal in size, shape and configuration and  midline. There is no significant white matter disease. There is no intracranial  hemorrhage, extra-axial collection, mass, mass effect or midline shift. The  basilar cisterns are open. No acute infarct is identified cervical fusion  hardware is incompletely seen. 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 The  visualized portions of the paranasal sinuses and mastoid air cells are clear.     IMPRESSION  IMPRESSION: No evidence of acute process.          MEDICATIONS GIVEN:  Medications - No data to display    IMPRESSION:  1. Visual loss, right eye    2. Elevated serum creatinine    3. CKD (chronic kidney disease), unspecified stage        PLAN:  1. Discharge Medication List as of 9/9/2017  7:26 PM        2. Follow-up Information     Follow up With Details Comments Contact Info    Rupinder Haider MD Schedule an appointment as soon as possible for a visit  [address] [place] [address] [address] [facility] [address]      [facility] Schedule an appointment as soon as possible for a visit  [address] [facility]  [place] [address] [facility] 600      [facility] EMERGENCY DEPT Go to As needed, If symptoms worsen [address] [place], [place] [phone]        3. Return to ED if worse      Discharge Note:    The patient is ready for discharge. The patient's signs, symptoms, diagnosis, and discharge instruction have been discussed and the patient has conveyed their understanding. The patient is to follow up as recommended or return to the ER should their symptoms worsen. Plan has been discussed and the patient is in agreement.     Alexa Botello NP

## 2017-09-15 NOTE — MR AVS SNAPSHOT
Visit Information Date & Time Provider Department Dept. Phone Encounter #  
 9/15/2017  2:40 PM Poornima Jacobsen MD [facility] Zelaya [phone] [id number] Your Appointments 9/26/2017  3:00 PM  
ECHO CARDIOGRAMS 2D with KIMBERLY VASQUEZ  
[facility] ([facility]) [address] [facility] [place] [address] [address] [facility] [address] [address] Upcoming Health Maintenance Date Due  
 FOOT EXAM Q1 5/10/1950 MICROALBUMIN Q1 5/10/1949 EYE EXAM RETINAL OR DILATED Q1 5/10/1949 DTaP/Tdap/Td series (1 - Tdap) 5/10/1960 ZOSTER VACCINE AGE 60> 3/10/1999 GLAUCOMA SCREENING Q2Y 5/10/2004 Pneumococcal 65+ Low/Medium Risk (1 of 2 - PCV13) 5/10/2004 MEDICARE YEARLY EXAM 5/10/2004 HEMOGLOBIN A1C Q6M 11/10/2012 LIPID PANEL Q1 8/21/2013 INFLUENZA AGE 9 TO ADULT 8/1/2017 Allergies as of 9/15/2017  Review Complete On: 9/15/2017 By: Crystal Vargas LPN Severity Noted Reaction Type Reactions Morphine Sulfate Medium 09/29/2012   Topical Itching Current Immunizations  Reviewed on 8/26/2012 No immunizations on file. Not reviewed this visit You Were Diagnosed With   
  
 Codes Comments Central artery occlusion of retina, right    -  Primary ICD-10-CM: H34.11 ICD-9-CM: 362.31 Essential hypertension     ICD-10-CM: I10 
ICD-9-CM: 401.9 Dyslipidemia     ICD-10-CM: E78.5 ICD-9-CM: 272.4   
 NICHOLE (obstructive sleep apnea)     ICD-10-CM: G47.33 
ICD-9-CM: 327.23 Venous insufficiency     ICD-10-CM: I87.2 ICD-9-CM: 459.81 Vitals BP Pulse Resp Height(growth percentile) Weight(growth percentile) SpO2  
 158/72 (BP 1 Location: Right arm, BP Patient Position: Sitting) 72 18 5' 7\" (1.702 m) 225 lb (102.1 kg) 94% BMI Smoking Status 35.24 kg/m2 Former Smoker BMI and BSA Data Body Mass Index Body Surface Area  
 35.24 kg/m 2 2.2 m 2 Preferred Pharmacy Pharmacy Name Phone  [address] [phone] Your Updated Medication List  
  
   
This list is accurate as of: 9/15/17  3:23 PM.  Always use your most recent med list.  
  
  
  
  
 Esposito Contreras 250-50 mcg/dose diskus inhaler Generic drug:  fluticasone-salmeterol Take 1 Puff by inhalation every twelve (12) hours. atorvastatin 20 mg tablet Commonly known as:  LIPITOR Take 1 Tab by mouth daily. CeleBREX 200 mg capsule Generic drug:  celecoxib Take  by mouth two (2) times a day. chlorthalidone 25 mg tablet Commonly known as:  Ulyess Cirri Take 12.5 mg by mouth daily. cyclobenzaprine 10 mg tablet Commonly known as:  FLEXERIL Take  by mouth three (3) times daily as needed for Muscle Spasm(s). dutasteride 0.5 mg capsule Commonly known as:  AVODART Take 1 Cap by mouth daily. EFFEXOR  mg capsule Generic drug:  venlafaxine-SR Take  by mouth two (2) times a day. ferrous gluconate 324 mg (38 mg iron) tablet Take 1 Tab by mouth daily (with breakfast). fish oil-dha-epa 1,200-144-216 mg Cap Take  by mouth. FLOMAX 0.4 mg capsule Generic drug:  tamsulosin Take 0.4 mg by mouth daily. labetalol 100 mg tablet Commonly known as:  Sussy Day Take  by mouth two (2) times a day. LYRICA 50 mg capsule Generic drug:  pregabalin Take 200 mg by mouth three (3) times daily. predniSONE 20 mg tablet Commonly known as:  Graydon [place] Take 80 mg by mouth daily. PROBIOTIC 4X 10-15 mg Tbec Generic drug:  B.infantis-B.ani-B.long-B.bifi Take  by mouth. PROTONIX 40 mg tablet Generic drug:  pantoprazole Take 40 mg by mouth daily. SINGULAIR 10 mg tablet Generic drug:  montelukast  
Take 10 mg by mouth daily. SPIRIVA WITH HANDIHALER 18 mcg inhalation capsule Generic drug:  tiotropium Take 1 Cap by inhalation daily. VENTOLIN HFA 90 mcg/actuation inhaler Generic drug:  albuterol Take  by inhalation. VITAMIN B-12 1,000 mcg tablet Generic drug:  cyanocobalamin Take 1,000 mcg by mouth daily. VITAMIN D3 1,000 unit Cap Generic drug:  cholecalciferol Take 2 Tabs by mouth daily. ZANTAC 150 mg tablet Generic drug:  raNITIdine Take 150 mg by mouth daily. ZOHYDRO ER 10 mg Cr12 Generic drug:  HYDROcodone bitartrate Take  by mouth as needed. We Performed the Following AMB POC EKG ROUTINE W/ 12 LEADS, INTER & REP [52721 CPT(R)] Introducing [facility] SERVICES! Dear Nabeel Harden: Thank you for requesting a [employer] account. Our records indicate that you already have an active [employer] account. You can access your account anytime at [email or web address] Did you know that you can access your hospital and ER discharge instructions at any time in [employer]? You can also review all of your test results from your hospital stay or ER visit. Additional Information If you have questions, please visit the Frequently Asked Questions section of the [employer] website at [email or web address] Remember, [employer] is NOT to be used for urgent needs. For medical emergencies, dial 911. Now available from your iPhone and Android! Please provide this summary of care documentation to your next provider. Your primary care clinician is listed as Khadijah Welsh. If you have any questions after today's visit, please call [phone].

## 2017-09-15 NOTE — PROGRESS NOTES
Freddy hC MD [facility]  [address],  [facility], [address] [facility]    Office [phone]  Fax [phone]  Cell [phone]        Aditya Mata is a 65 y.o. male referred for evaluation of right vision loss    Assessment  Encounter Diagnoses   Name Primary?  Central artery occlusion of retina, right Yes    Essential hypertension     Dyslipidemia     NICHOLE (obstructive sleep apnea)     Venous insufficiency     Type 2 diabetes mellitus with stage 3 chronic kidney disease, without long-term current use of insulin ([facility])     CKD (chronic kidney disease), stage III        Recommendations:    Aditya Maat has recent right vision loss without improvement over the past week, etiology unclear but probably temporary arteritis. This does not appear to be a stroke syndrome but he is clearly at risk for AF by virtue of obesity, HTN, NICHOLE. His exam and EKG are unremarkable other than mild NM prolongation. He does have significant CKD. Will update cardiac evaluation with echo to ensure no change in LV function and to evaluate atrial dimensions. Phone follow up after reviewing tests    Subjective:  I last saw him in 2012. Cardiac evaluation with echo and stress nuclear study was negative other than LVH. He does have HTN, T2DM and stage IV CKD. He recently experienced right vision loss. ED evaluation with head CT and carotid duplex was unremarkable. Lab testing revealed Hg 10.2, ESR 49, CR 3.2. Mr. Horace Oppenheim reports sudden, complete loss of vision almost one week ago without return of vision. 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 He had a temporal artery biopsy on Monday and is waiting for his results. Patient notes he saw an ophthalmologist and was informed vision loss is permanent. He has NICHOLE, wears CPAP. His activity is limited by neuropathy. Patient was started on high dose steroids earlier this week.  He continues to take Celebrex even though he was informed to stop by his nephrologist, Dr. Pee May. He has an inhaler for COPD but denies any SOB. Patient denies any exertional chest pain dyspnea, palpitations, syncope, orthopnea, edema or paroxysmal nocturnal dyspnea. Cardiac risk factors   HTN yes  DM yes- prediabetes   Smoking yes, former smoker    Cardiac testing  ECHO: 8/15/12: LVH, EF 55-60%  Lexiscan cardiolite 11/12 - fixed inferior defect, no ischemia, EF 52%    Past Medical History:   Diagnosis Date    Anxiety and depression     pain related    BPH (benign prostatic hyperplasia)     Cardiac arrest ([facility] [address]     S/p dav-asystolic arrest due to CO2 retention  8/12    COPD (chronic obstructive pulmonary disease) (HCC)     mild    DDD (degenerative disc disease), cervical     DDD (degenerative disc disease), thoracolumbar     DJD (degenerative joint disease)     hips, shoulders    GERD (gastroesophageal reflux disease)     H/O Clostridium difficile infection     History of non-ST elevation myocardial infarction (NSTEMI)     Post op NSTEMI- supply demand-EF nl    Hx of transfusion of whole blood 7/2011    [address], NO REACTION    Hyperlipidemia     Hypertension     NICHOLE (obstructive sleep apnea)     refuses CPAP    Peripheral neuropathy (HCC)     Pneumonia     Unspecified sleep apnea     sleeps with a CPAP    Urinary retention         Current Outpatient Prescriptions   Medication Sig Dispense Refill    fluticasone-salmeterol (ADVAIR DISKUS) 250-50 mcg/dose diskus inhaler Take 1 Puff by inhalation every twelve (12) hours.  B.infantis-B.ani-B.long-B.bifi (PROBIOTIC 4X) 10-15 mg TbEC Take  by mouth.  chlorthalidone (HYGROTEN) 25 mg tablet Take 12.5 mg by mouth daily.  cyclobenzaprine (FLEXERIL) 10 mg tablet Take  by mouth three (3) times daily as needed for Muscle Spasm(s).  predniSONE (DELTASONE) 20 mg tablet Take 80 mg by mouth daily.  pantoprazole (PROTONIX) 40 mg tablet Take 40 mg by mouth daily.       montelukast (SINGULAIR) 10 mg tablet Take 10 mg by mouth daily.  raNITIdine (ZANTAC) 150 mg tablet Take 150 mg by mouth daily.  HYDROcodone bitartrate (ZOHYDRO ER) 10 mg CR12 Take  by mouth as needed.  tiotropium (SPIRIVA WITH HANDIHALER) 18 mcg inhalation capsule Take 1 Cap by inhalation daily.  albuterol (VENTOLIN HFA) 90 mcg/actuation inhaler Take  by inhalation.  cyanocobalamin (VITAMIN B-12) 1,000 mcg tablet Take 1,000 mcg by mouth daily.  fish oil-dha-epa 1,200-144-216 mg cap Take  by mouth.  celecoxib (CELEBREX) 200 mg capsule Take  by mouth two (2) times a day.  venlafaxine-SR (EFFEXOR XR) 150 mg capsule Take  by mouth two (2) times a day.  dutaseride (AVODART) 0.5 mg capsule Take 1 Cap by mouth daily. 30 Cap 1    labetalol (NORMODYNE) 100 mg tablet Take  by mouth two (2) times a day.  ferrous gluconate 324 mg (38 mg iron) tablet Take 1 Tab by mouth daily (with breakfast). 30 Tab 1    atorvastatin (LIPITOR) 20 mg tablet Take 1 Tab by mouth daily. 30 Tab 1    pregabalin (LYRICA) 50 mg capsule Take 200 mg by mouth three (3) times daily.  Cholecalciferol, Vitamin D3, (VITAMIN D) 1,000 unit Cap Take 2 Tabs by mouth daily.  tamsulosin (FLOMAX) 0.4 mg capsule Take 0.4 mg by mouth daily. Allergies   Allergen Reactions    Morphine Sulfate Itching          Review of Systems  Constitutional: Negative for fever, chills, malaise/fatigue and diaphoresis. Respiratory: Negative for cough, hemoptysis, sputum production, shortness of breath and wheezing. Cardiovascular: Negative for chest pain, palpitations, orthopnea, claudication, leg swelling and PND. Gastrointestinal: Negative for heartburn, nausea, vomiting, blood in stool and melena. Genitourinary: Negative for dysuria and flank pain. Musculoskeletal: Negative for joint pain and back pain. Skin: Negative for rash.   Neurological: Negative for focal weakness, seizures, loss of consciousness, weakness and headaches. Positive for neuropathy. Endo/Heme/Allergies: Does not bruise/bleed easily. Psychiatric/Behavioral: Negative for memory loss. The patient does not have insomnia. Eyes: Positive for right vision loss. Physical Exam    Visit Vitals    /72 (BP 1 Location: Right arm, BP Patient Position: Sitting)    Pulse 72    Resp 18    Ht 5' 7\" (1.702 m)    Wt 225 lb (102.1 kg)    SpO2 94%    BMI 35.24 kg/m2     Wt Readings from Last 3 Encounters:   09/15/17 225 lb (102.1 kg)   09/09/17 230 lb (104.3 kg)   07/25/14 190 lb (86.2 kg)      General - well developed well nourished  Neck - JVP normal, thyroid nl  Cardiac - normal S1, S2, no murmurs, rubs or gallops.  No clicks  Vascular - carotids without bruits, radials, femorals and pedal pulses equal bilateral  Lungs - clear to auscultation bilaterals, no rales, wheezing or rhonchi  Abd - soft nontender, no HSM, no abd bruits  Extremities - no edema  Skin - no rash  Neuro - nonfocal  Psych - normal mood and affect      Cardiographics  Carotid duplex 9/9/17- 0-49% bilaterally  SR 66, 1st degree AV block , leftward axis, no signficant change from 6/8/17    Written by Viola Ashley, as dictated by Anne Mckeon MD.  Sriram Tam

## 2017-10-02 NOTE — TELEPHONE ENCOUNTER
Cardiac testing  ECHO: 8/15/12: LVH, EF 55-60%  Lexiscan cardiolite 11/12 - fixed inferior defect, no ischemia, EF 52%    Echo 9/26/17 - EF 60-65%. No WMA. Mild LVH. Trivial pericardial effusion - no hemodynamic compromise. Mr. Greg Heart was seen as a new patient by Dr. Ashleigh Carr on 9/15/17. Referred for further evaluation of right vision loss. EKG and exam were unremarkable other than mild ID prolongation. An Echo was performed to further evaluate LV function and assess atrial dimensions. I have informed he and his wife of normal echo findings - EF preserved, normal atrial dimensions, mild LVH which is unchanged compared to study in 2012. He reports doing well since he was first seen. Right vision loss remains present and he states that he has been advised that this will be permanent. No cause of loss identified. I have encouraged him to call or return to the office with any other questions or concerns.

## 2017-10-02 NOTE — PROGRESS NOTES
Chief Complaint (CC): wound in Left gluteal crease, and Left pretib lower leg. .  Present Illness (PI): Wound in buttocks developed with pain suddenly a couple days ago. Wound over L pretib is from a fall from the shower chair last week. Wife is doing the dressings. .  Past History (PMedHx): Note in particular diffuse arthritis appearing like RA (never called that), recent course of prednisone in hospital after sudden onset blindness OD (? Giant cell arteritis- proved not to be), recent cardiac Echo with Dr. Letty Ruff who felt it was 'Ok\". Medications and Allergies: as per today's data for this patient in '[facility]'. I have reviewed and concur. Illnesses: as per '[facility]' data noted today. Surgeries and Injuries: as per '[facility]' data noted today. Review of Systems (ROS):                        Integumentary: Other than as noted in 'PI'; skin hair and nails normal for age, with no new rash, lumps, bumps, eruptions or bleeding. Lymph: no new prominent nodes or drainage near lymph nodes. Bones, Joints, and Muscles: Other than as noted in 'PI' no new fractures, dislocations, weakness or pain. 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 Oris Mulligan Hematopoietic: no new bleeding or bruising or anemia changes. Scattered bruises as usual.                        Eyes: no recent trauma or inflammation. 0. Eye glasses. -. Intra Occular Lens Implants (IOLI) wears an eye patch OD today                        Ears: Hearing is unchanged and usually good. Nose: no new drainage, rhinorhea or epistaxis. Mouth, and throat: no soreness, drainage or lesions. Has upper. Dentures.                         Neck: no new masses, drainage or pain                        Respiratory; no hemoptysis, current shortness of breath or pain with breath. Cardiovascular: No chest pain, palpitation or tachycardia. .                        Gastrointestinal: no recent change in appetite, stools or food tolerance. No jaundice, hematemesis, vomiting or diarrhea                        Genito-Urinary: urine color, frequency, sensation unchanged                        Neurologic: no syncope, dizzyness or unusual sensations. Psychologic and Mental Status: no change in mood, sleep or memory recently     Social History: '[facility]' data today is noted. Lives with wife who does most dressings. Family History: '[facility]' data today is noted. Sabra Correia Physical Exam:      General: alert cooperative, laying in bed on R side with eye patch OD,   Head, Eyes, Ears, Nose and Throat: normocephalic, Has pupil response to light and some peripheral vision OD, OS ok, dentures upper, . Neck: supple without masses or adenopathy. No bruit. Chest: full excursion without deformity, . Lungs: scattered dry rales diffusely. Heart: few ectopics, 1/6 LOS. Abdomen: soft protuberant, no mas or tenderness. .  Vascular:                         Pulses:                                            R                    L                                               Radial                        ++. ++.                                              Femoral                     +.                 +.                                              DP                             ++. ++.                                              PT                             +.                 +.  Extremities: L pretib mid with recent abrasion and area of full thickness avulsion upper portion. What appears to be 'Heberdens nodes' noted over several joints of hands, wrists, feet. Perineum: R gluteal crease near midline just posterior to scrotum with shallow ulcer surrounded by pink healped up tissue, mildly tender.       Vital signs and data recorded in '[facility]' for this patient today is noted, reviewed and considered. Patient notes today: some pain in the buttocks wound. Procedure Note     Name of Procedure: sharp debridement subQ. PreOp diagnosis: open wounds LLE, Buttocks. .  Anaesthesia: topical lidocaine. Description: using a sharp curette I removed exudate in the ulcer base L pretib and gluteal crease area with resulting good bleeding base both areas. .  Blood Loss: minimal.  Post Op Diagnosis, and condition: some pain with debridement of buttocks only. .  Follow Up Plan: will use Aquacel Ag both ulcers one week and hope to discontinue at least the buttocks area at next visit one week. The character of the skin and joints suggesting RA, I will add Vit A 10,000units bid. Prognosis: good. Govind Means

## 2017-10-09 NOTE — PROGRESS NOTES
I have noted, and reviewed today's data for this patient in [facility], and '[facility]' and concur with same. The focused physical exam today is unchanged except as noted below. Patient notes today: They say there's three wounds in the buttocks now. I keep bumping my lower legs. .  Lesion/Wound, focused exam on Presentation today: three small fleshy follicular over the perineal area, no drainage, the lesion previously noted is crusted, several areas are somewhat moist today but not macerated . LLE pretib area with remaining linear ulcer with exudate  RLE pretib area with area of abrasion and superficial epidermal avulsion  Procedure:     Wound # BLEs. Procedure name: sharp debridement. Anaesthesia: topical lidocaine. Description: using a sharp curette I removed the adherent debri and exudate in the linear ulcer LLE to resulting clean bleeding base. .  Blood Loss: minimal.  Post Procedure Condition/ Diagnosis: healing wounds seem mostly due to local trauma. I used a 1/2 inch steri strip to pin down the loose skin in the area of avulsion RLE. I counseled wife and patient at length regarding avoiding trauma, collision to the lower legs and concerning local wound care and protection to the skin of the buttocks. Follow up plan today: Aquacel Ag to LLE wound, cover and protect buttocks and RLE wounds. Return 1-2 weeks. Amada Correia

## 2017-10-23 NOTE — PROGRESS NOTES
I have noted, and reviewed today's data for this patient in [facility], and '[facility]' and concur with same. The focused physical exam today is unchanged except as noted below. Patient notes today: I fell out of bed and got a new wound R flank, Wife is concerned that the wounds on the front of the ankles aren't healing. R knee looks better. Missy Blackwell  Lesion/Wound, focused exam on Presentation today: A. R flank with large area of bruising, a central stripe of abrasion, denudation with some crusting. B. R Knee wound covered with minimal dry crusting. C L butocks wound remains healed/closed  D RLE anterior ankle area small ulcer remains, minimal exudate. E. LLE Medial calf area two lines of abrasion with some spots of red blood noted. F,. LLE anterior ankle area with shallow ulcer centered in the patch of pink, thin skin. .    Procedure:     Wound #  D. F. .  Procedure name: sharp debridement of open ulcers-selective. Anaesthesia: topical lidocaine. Description: using a sharp curette I removed exudate and slough in the two ankle ulcers only to a resulting clean bleeding base./.  Blood Loss: minimal.  Post Procedure Condition/ Diagnosis: healing with new trauma producing further abrasion to LLE medially and the area of anterior ankle ulcer as well as R Flank. .  Follow up plan today: will try going to same [employer] Ag but with Tubigrip application over both LEs. Will keep R flank clean and covered/dry return 1 week. Missy Blackwell

## 2017-10-30 NOTE — PROGRESS NOTES
I have noted, and reviewed today's data for this patient in [facility], and '[facility]' and concur with same. The focused physical exam, past history, review of symptoms and medications remains unchanged today except as noted below. Patient Report: I'm better, Wife feels that wounds might be healed. .  Lesion/Wound, focused exam on Presentation today: Ulcerations over lower pretib L>RLE are  Now covered with skin, though thin. Both areas are clean. LLE shows more scaling of skin/ dryness. .    Follow up plan:   Plan better adherence to moisturizer such as Vit A&D ointment. Return if new problems. Angi Mulligan

## 2017-12-06 NOTE — ED PROVIDER NOTES
HPI Comments: 65 y.o. male with extensive past medical history, please see list, significant for peripheral neuropathy, hyperlipidemia, hypertension, COPD, GERD, anxiety, depression, DDD, NSTEMI, and pneumonia who presents from home via EMS with chief complaint of back pain. Patient states onset of moderate lower back pain since last night that woke up from sleep. Patient notes the pain radiates down to the right hip and leg and is aggravated upon palpation of the lower back. Wife reports the patient is wheelchair bound at baseline; however, he has been unable to move around much due to the back pain. Patient denies any hx of the present sx or having any known mechanism of injury. Patient denies any other issues including chest pain, shortness of breath, abdominal pain, headache, and syncope. There are no other acute medical concerns at this time. Social hx: Tobacco Use: No (former smoker), Alcohol Use: No, Drug Use: No    PCP: Genoveva Thompson MD    Note written by Braulio Suresh, as dictated by Al Lennon MD 11:01 AM      The history is provided by the patient, the spouse and medical records. Past Medical History:   Diagnosis Date    Anxiety and depression     pain related    BPH (benign prostatic hyperplasia)     Cardiac arrest ([facility] [address]     S/p dav-asystolic arrest due to CO2 retention  8/12    COPD (chronic obstructive pulmonary disease) (HCC)     mild    DDD (degenerative disc disease), cervical     DDD (degenerative disc disease), thoracolumbar     DJD (degenerative joint disease)     hips, shoulders    GERD (gastroesophageal reflux disease)     H/O Clostridium difficile infection     History of non-ST elevation myocardial infarction (NSTEMI)     Post op NSTEMI- supply demand-EF nl    Hx of transfusion of whole blood 7/2011    ST.  [address], NO REACTION    Hyperlipidemia     Hypertension     NICHOLE (obstructive sleep apnea)     refuses CPAP    Peripheral neuropathy     Pneumonia     Unspecified sleep apnea     sleeps with a CPAP    Urinary retention        Past Surgical History:   Procedure Laterality Date    HX ADENOIDECTOMY      AS A CHILD    HX CERVICAL FUSION  7/2011    C2-C3 SCAR CLEAN UP, TERENCE EDDIE    HX CERVICAL LAMINECTOMY  2005    DECOMPRESSIVE CERVIVAL LAMINECTOMY C1-C7    HX GI      COLONOSOCPY    HX HEENT      wisdom teeth    HX HERNIA REPAIR      UMBILICAL    HX LUMBAR LAMINECTOMY  2004    L4-L5 AND DECOMPRESSION L1-S1    HX ORTHOPAEDIC  2004    Thoracic laminectomy    HX ORTHOPAEDIC  7/2011    C1-C2 Decompressive laminectomy     HX ORTHOPAEDIC  2012    hip replacement, right    HX SHOULDER ARTHROSCOPY  10/17/2009    RT. SHOULDER    HX SHOULDER REPLACEMENT  1991, 1993, 2010    bilat shoulderREPLACEMNET 2X EACH SHOULDER    HX THORACIC LAMINECTOMY  2004    T9-T12 W/DECOMPRESSION OF SPINAL CORD    HX TONSILLECTOMY      AS A CHILD         Family History:   Problem Relation Age of Onset    Cancer Mother      PANCREATIC    Hypertension Father     Stroke Father      CREBRAL HEMORRHAGE    Other Brother      paraplegic   [facility] Arthritis-rheumatoid Daughter     Malignant Hyperthermia Neg Hx     Pseudocholinesterase Deficiency Neg Hx     Delayed Awakening Neg Hx     Post-op Nausea/Vomiting Neg Hx     Emergence Delirium Neg Hx     Post-op Cognitive Dysfunction Neg Hx        Social History     Social History    Marital status:      Spouse name: N/A    Number of children: N/A    Years of education: N/A     Occupational History    Not on file. Social History Main Topics    Smoking status: Former Smoker     Packs/day: 1.00     Quit date: 6/9/1986    Smokeless tobacco: Never Used    Alcohol use No    Drug use: No    Sexual activity: Not on file     Other Topics Concern    Not on file     Social History Narrative         ALLERGIES: Morphine sulfate    Review of Systems   Constitutional: Negative for chills and fever. HENT: Negative for ear pain and sore throat. Eyes: Negative for photophobia and pain. Respiratory: Negative for chest tightness and shortness of breath. Cardiovascular: Negative for chest pain and leg swelling. Gastrointestinal: Negative for abdominal pain, nausea and vomiting. Genitourinary: Negative for dysuria and flank pain. Musculoskeletal: Positive for back pain and myalgias (right buttocks and leg radiating from the lower back). Negative for neck pain. Skin: Negative for rash and wound. Neurological: Negative for dizziness, light-headedness and headaches. All other systems reviewed and are negative. Vitals:    12/06/17 1032   BP: 130/57   Pulse: 73   Resp: 16   Temp: 98.6 °F (37 °C)   SpO2: 96%   Weight: 99.8 kg (220 lb)   Height: 5' 6\" (1.676 m)            Physical Exam   Constitutional: He is oriented to person, place, and time. He appears well-developed and well-nourished. HENT:   Head: Normocephalic and atraumatic. Cardiovascular: Normal rate, regular rhythm and intact distal pulses. Pulmonary/Chest: Effort normal. No respiratory distress. Abdominal: Soft. Bowel sounds are normal. He exhibits distension. There is no tenderness. There is no rebound. Musculoskeletal: He exhibits tenderness. Diffuse lower back   Neurological: He is alert and oriented to person, place, and time. Skin: He is not diaphoretic. Nursing note and vitals reviewed. MDM  Number of Diagnoses or Management Options  Bilateral low back pain without sciatica, unspecified chronicity:   Drug-induced constipation:   Diagnosis management comments: Patient with acute on chronic low back pain - check KUB for constipation, meds for symptoms in the ED. Wife is not giving patient his flexeril every 8 hours only at bedtime.   No new injury reported - no urinary symptoms at this time       Amount and/or Complexity of Data Reviewed  Clinical lab tests: ordered and reviewed  Tests in the radiology section of CPT®: ordered and reviewed      ED Course       Procedures     Final result (Exam End: 12/6/2017 11:51 AM) Open        Study Result      EXAM:  XR ABD (KUB)     INDICATION:  Acute low back pain last night. Constipation.     COMPARISON: 8/16/2012.     TECHNIQUE: Frontal supine abdomen view     FINDINGS: There is a large amount of colonic stool. There are no dilated bowel  loops. There are degenerative changes in the spine. A right hip prosthesis is  partially visualized.     IMPRESSION  IMPRESSION: Large amount of colonic stool. No evidence for bowel obstruction.

## 2017-12-06 NOTE — DISCHARGE INSTRUCTIONS
Learning About Relief for Back Pain  What is back tension and strain? Back strain happens when you overstretch, or pull, a muscle in your back. You may hurt your back in an accident or when you exercise or lift something. Most back pain will get better with rest and time. You can take care of yourself at home to help your back heal.  What can you do first to relieve back pain? When you first feel back pain, try these steps:  · Walk. Take a short walk (10 to 20 minutes) on a level surface (no slopes, hills, or stairs) every 2 to 3 hours. Walk only distances you can manage without pain, especially leg pain. · Relax. Find a comfortable position for rest. Some people are comfortable on the floor or a medium-firm bed with a small pillow under their head and another under their knees. Some people prefer to lie on their side with a pillow between their knees. Don't stay in one position for too long. · Try heat or ice. Try using a heating pad on a low or medium setting, or take a warm shower, for 15 to 20 minutes every 2 to 3 hours. Or you can buy single-use heat wraps that last up to 8 hours. You can also try an ice pack for 10 to 15 minutes every 2 to 3 hours. You can use an ice pack or a bag of frozen vegetables wrapped in a thin towel. There is not strong evidence that either heat or ice will help, but you can try them to see if they help. You may also want to try switching between heat and cold. · Take pain medicine exactly as directed. ¨ If the doctor gave you a prescription medicine for pain, take it as prescribed. ¨ If you are not taking a prescription pain medicine, ask your doctor if you can take an over-the-counter medicine. What else can you do? · Stretch and exercise. Exercises that increase flexibility may relieve your pain and make it easier for your muscles to keep your spine in a good, neutral position. And don't forget to keep walking. · Do self-massage.  You can use self-massage to unwind after work or school or to energize yourself in the morning. You can easily massage your feet, hands, or neck. Self-massage works best if you are in comfortable clothes and are sitting or lying in a comfortable position. Use oil or lotion to massage bare skin. · Reduce stress. Back pain can lead to a vicious [place]: Distress about the pain tenses the muscles in your back, which in turn causes more pain. Learn how to relax your mind and your muscles to lower your stress. Where can you learn more? Go to http://roque-shanelel.info/. Enter E295 in the search box to learn more about \"Learning About Relief for Back Pain. \"  Current as of: March 21, 2017  Content Version: 11.4  © 0081-0072 [email or web address]. Care instructions adapted under license by [employer] (which disclaims liability or warranty for this information). If you have questions about a medical condition or this instruction, always ask your healthcare professional. [place] [address] any warranty or liability for your use of this information. Constipation: Care Instructions  Your Care Instructions    Constipation means that you have a hard time passing stools (bowel movements). People pass stools from 3 times a day to once every 3 days. What is normal for you may be different. Constipation may occur with pain in the rectum and cramping. The pain may get worse when you try to pass stools. Sometimes there are small amounts of bright red blood on toilet paper or the surface of stools. This is because of enlarged veins near the rectum (hemorrhoids). A few changes in your diet and lifestyle may help you avoid ongoing constipation. Your doctor may also prescribe medicine to help loosen your stool. Some medicines can cause constipation. These include pain medicines and antidepressants. Tell your doctor about all the medicines you take.  Your doctor may want to make a medicine change to ease your symptoms. Follow-up care is a key part of your treatment and safety. Be sure to make and go to all appointments, and call your doctor if you are having problems. It's also a good idea to know your test results and keep a list of the medicines you take. How can you care for yourself at home? · Drink plenty of fluids, enough so that your urine is light yellow or clear like water. If you have kidney, heart, or liver disease and have to limit fluids, talk with your doctor before you increase the amount of fluids you drink. · Include high-fiber foods in your diet each day. These include fruits, vegetables, beans, and whole grains. · Get at least 30 minutes of exercise on most days of the week. Walking is a good choice. You also may want to do other activities, such as running, swimming, cycling, or playing tennis or team sports. · Take a fiber supplement, such as Citrucel or Metamucil, every day. Read and follow all instructions on the label. · Schedule time each day for a bowel movement. A daily routine may help. Take your time having your bowel movement. · Support your feet with a small step stool when you sit on the toilet. This helps flex your hips and places your pelvis in a squatting position. · Your doctor may recommend an over-the-counter laxative to relieve your constipation. Examples are Milk of Magnesia and MiraLax. Read and follow all instructions on the label. Do not use laxatives on a long-term basis. When should you call for help? Call your doctor now or seek immediate medical care if:  ? · You have new or worse belly pain. ? · You have new or worse nausea or vomiting. ? · You have blood in your stools. ? Watch closely for changes in your health, and be sure to contact your doctor if:  ? · Your constipation is getting worse. ? · You do not get better as expected. Where can you learn more? Go to http://roque-shanelle.info/.   Enter 21 [id number] in the search box to learn more about \"Constipation: Care Instructions. \"  Current as of: March 20, 2017  Content Version: 11.4  © 9768-3142 Healthwise, [employer]. Care instructions adapted under license by [employer] (which disclaims liability or warranty for this information). If you have questions about a medical condition or this instruction, always ask your healthcare professional. [place] [address] any warranty or liability for your use of this information.

## 2017-12-06 NOTE — ED TRIAGE NOTES
Pt arrives via EMS. Pt reports new low back pain starting last night that woke him up out of his sleep. Pt is normally wheelchair bound. Reports pain radiates to his right buttocks and leg. Denies CP.

## 2017-12-13 NOTE — DISCHARGE INSTRUCTIONS
Learning About How to Have a Healthy Back  What causes back pain? Back pain is often caused by overuse, strain, or injury. For example, people often hurt their backs playing sports or working in the yard, being jolted in a car accident, or lifting something too heavy. Aging plays a part too. Your bones and muscles tend to lose strength as you age, which makes injury more likely. The spongy discs between the bones of the spine (vertebrae) may suffer from wear and tear and no longer provide enough cushion between the bones. A disc that bulges or breaks open (herniated disc) can press on nerves, causing back pain. In some people, back pain is the result of arthritis, broken vertebrae caused by bone loss (osteoporosis), illness, or a spine problem. Although most people have back pain at one time or another, there are steps you can take to make it less likely. How can you have a healthy back? Reduce stress on your back through good posture  Slumping or slouching alone may not cause low back pain. But after the back has been strained or injured, bad posture can make pain worse. · Sleep in a position that maintains your back's normal curves and on a mattress that feels comfortable. Sleep on your side with a pillow between your knees, or sleep on your back with a pillow under your knees. These positions can reduce strain on your back. · Stand and sit up straight. \"Good posture\" generally means your ears, shoulders, and hips are in a straight line. · If you must stand for a long time, put one foot on a stool, ledge, or box. Switch feet every now and then. · Sit in a chair that is low enough to let you place both feet flat on the floor with both knees nearly level with your hips. If your chair or desk is too high, use a footrest to raise your knees. Place a small pillow, a rolled-up towel, or a lumbar roll in the curve of your back if you need extra support.   · Try a kneeling chair, which helps tilt your hips forward. This takes pressure off your lower back. · Try sitting on an exercise ball. It can rock from side to side, which helps keep your back loose. · When driving, keep your knees nearly level with your hips. Sit straight, and drive with both hands on the steering wheel. Your arms should be in a slightly bent position. Reduce stress on your back through careful lifting  · Squat down, bending at the hips and knees only. If you need to, put one knee to the floor and extend your other knee in front of you, bent at a right angle (half kneeling). · Press your chest straight forward. This helps keep your upper back straight while keeping a slight arch in your low back. · Hold the load as close to your body as possible, at the level of your belly button (navel). · Use your feet to change direction, taking small steps. · Lead with your hips as you change direction. Keep your shoulders in line with your hips as you move. · Set down your load carefully, squatting with your knees and hips only. Exercise and stretch your back  · Do some exercise on most days of the week, if your doctor says it is okay. You can walk, run, swim, or cycle. · Stretch your back muscles. Here are a few exercises to try:  [place] on your back, and gently pull one bent knee to your chest. Put that foot back on the floor, and then pull the other knee to your chest.  ¨ Do pelvic tilts. Lie on your back with your knees bent. Tighten your stomach muscles. Pull your belly button (navel) in and up toward your ribs. You should feel like your back is pressing to the floor and your hips and pelvis are slightly lifting off the floor. Hold for 6 seconds while breathing smoothly. ¨ Sit with your back flat against a wall. · Keep your core muscles strong. The muscles of your back, belly (abdomen), and buttocks support your spine. ¨ Pull in your belly and imagine pulling your navel toward your spine. Hold this for 6 seconds, then relax.  Remember to keep breathing normally as you tense your muscles. ¨ Do curl-ups. Always do them with your knees bent. Keep your low back on the floor, and curl your shoulders toward your knees using a smooth, slow motion. Keep your arms folded across your chest. If this bothers your neck, try putting your hands behind your neck (not your head), with your elbows spread apart. ¨ Lie on your back with your knees bent and your feet flat on the floor. Tighten your belly muscles, and then push with your feet and raise your buttocks up a few inches. Hold this position 6 seconds as you continue to breathe normally, then lower yourself slowly to the floor. Repeat 8 to 12 times. ¨ If you like group exercise, try Pilates or yoga. These classes have poses that strengthen the core muscles. Lead a healthy lifestyle  · Stay at a healthy weight to avoid strain on your back. · Do not smoke. Smoking increases the risk of osteoporosis, which weakens the spine. If you need help quitting, talk to your doctor about stop-smoking programs and medicines. These can increase your chances of quitting for good. Where can you learn more? Go to http://roqueVoxyshanelle.info/. Enter L315 in the search box to learn more about \"Learning About How to Have a Healthy Back. \"  Current as of: March 21, 2017  Content Version: 11.4  © 3658-5268 Healthwise[employer]. Care instructions adapted under license by [employer] (which disclaims liability or warranty for this information). If you have questions about a medical condition or this instruction, always ask your healthcare professional. [place] [address] any warranty or liability for your use of this information. Blood in the Urine: Care Instructions  Your Care Instructions    Blood in the urine, or hematuria, may make the urine look red, brown, or pink. There may be blood every time you urinate or just from time to time.  You cannot always see blood in the urine, but it will show up in a urine test.  Blood in the urine may be serious. It should always be checked by a doctor. Your doctor may recommend more tests, including an X-ray, a CT scan, or a cystoscopy (which lets a doctor look inside the urethra and bladder). Blood in the urine can be a sign of another problem. Common causes are bladder infections and kidney stones. An injury to your groin or your genital area can also cause bleeding in the urinary tract. Very hard exercise-such as running a marathon-can cause blood in the urine. Blood in the urine can also be a sign of kidney disease or cancer in the bladder or kidney. Many cases of blood in the urine are caused by a harmless condition that runs in families. This is called benign familial hematuria. It does not need any treatment. Sometimes your urine may look red or brown even though it does not contain blood. For example, not getting enough fluids (dehydration), taking certain medicines, or having a liver problem can change the color of your urine. Eating foods such as beets, rhubarb, or blackberries or foods with red food coloring can make your urine look red or pink. Follow-up care is a key part of your treatment and safety. Be sure to make and go to all appointments, and call your doctor if you are having problems. It's also a good idea to know your test results and keep a list of the medicines you take. When should you call for help? Call your doctor now or seek immediate medical care if:  · You have symptoms of a urinary infection. For example:  ¨ You have pus in your urine. ¨ You have pain in your back just below your rib cage. This is called flank pain. ¨ You have a fever, chills, or body aches. ¨ It hurts to urinate. ¨ You have groin or belly pain. · You have more blood in your urine. Watch closely for changes in your health, and be sure to contact your doctor if:  · You have new urination problems. · You do not get better as expected.   Where can you learn more? Go to http://roque-shanelle.info/. Enter Z045 in the search box to learn more about \"Blood in the Urine: Care Instructions. \"  Current as of: May 12, 2017  Content Version: 11.4  © 6451-2171 Healthwise, [employer]. Care instructions adapted under license by [employer] (which disclaims liability or warranty for this information). If you have questions about a medical condition or this instruction, always ask your healthcare professional. [address] any warranty or liability for your use of this information.

## 2017-12-13 NOTE — ED TRIAGE NOTES
Sent from Dr. Cardenas Salvage office for evaluation of hematuria and lower back pain since yesterday.

## 2017-12-13 NOTE — ED PROVIDER NOTES
HPI Comments: 65 y.o. male with extensive past medical history, please see list, significant for peripheral neuropathy, hyperlipidemia, hypertension, COPD, GERD, anxiety, depression, DDD, NSTEMI, and pneumonia who presents from home via EMS with chief complaint of back pain. Pt complains of bilateral lumbar back pain with radiation to the right hip progressively worsening over the past few days. Pt saw PCP yesterday and was found to have microscopic hematuria prompting referral for further evaluation in the ED. Pt reports last urination ~09:00 today with no abnormal changes and normal frequency. Additionally, pt reports recent laryngitis with associated productive cough and congestion. Pt currently taking hydrocodone bitartrate and cyclobenzaprine to manage pain (supervised by PCP Dr. Farida Dunham). Pt denies chronic pain similar to current. Pt denies hx of kidney stones. Pt denies abdominal pain, n/v, fever, or chills. There are no other acute medical concerns at this time. Social hx: +Former tobacco smoker -EtOH use -Illicit drug use   Significant FMHx: per wife, \"mother and grandmother with prostate cancer\"  PCP: Mike Watermna MD    Note written by Ada. Marcela Bates, as dictated by Zach Altamirano MD 1:00 PM      The history is provided by the patient. No  was used.         Past Medical History:   Diagnosis Date    Anxiety and depression     pain related    BPH (benign prostatic hyperplasia)     Cardiac arrest ([facility] [address]     S/p dav-asystolic arrest due to CO2 retention  8/12    COPD (chronic obstructive pulmonary disease) (HCC)     mild    DDD (degenerative disc disease), cervical     DDD (degenerative disc disease), thoracolumbar     DJD (degenerative joint disease)     hips, shoulders    GERD (gastroesophageal reflux disease)     H/O Clostridium difficile infection     History of non-ST elevation myocardial infarction (NSTEMI)     Post op NSTEMI- supply demand-EF nl    Hx of transfusion of whole blood 7/2011    ST[address], NO REACTION    Hyperlipidemia     Hypertension     NICHOLE (obstructive sleep apnea)     refuses CPAP    Peripheral neuropathy     Pneumonia     Unspecified sleep apnea     sleeps with a CPAP    Urinary retention        Past Surgical History:   Procedure Laterality Date    HX ADENOIDECTOMY      AS A CHILD    HX CERVICAL FUSION  7/2011    C2-C3 SCAR CLEAN UP, TERENCE EDDIE    HX CERVICAL LAMINECTOMY  2005    DECOMPRESSIVE CERVIVAL LAMINECTOMY C1-C7    HX GI      COLONOSOCPY    HX HEENT      wisdom teeth    HX HERNIA REPAIR      UMBILICAL    HX LUMBAR LAMINECTOMY  2004    L4-L5 AND DECOMPRESSION L1-S1    HX ORTHOPAEDIC  2004    Thoracic laminectomy    HX ORTHOPAEDIC  7/2011    C1-C2 Decompressive laminectomy     HX ORTHOPAEDIC  2012    hip replacement, right    HX SHOULDER ARTHROSCOPY  10/17/2009    RT. SHOULDER    HX SHOULDER REPLACEMENT  1991, 1993, 2010    bilat shoulderREPLACEMNET 2X EACH SHOULDER    HX THORACIC LAMINECTOMY  2004    T9-T12 W/DECOMPRESSION OF SPINAL CORD    HX TONSILLECTOMY      AS A CHILD         Family History:   Problem Relation Age of Onset    Cancer Mother      PANCREATIC    Hypertension Father     Stroke Father      CREBRAL HEMORRHAGE    Other Brother      paraplegic   [facility] Arthritis-rheumatoid Daughter     Malignant Hyperthermia Neg Hx     Pseudocholinesterase Deficiency Neg Hx     Delayed Awakening Neg Hx     Post-op Nausea/Vomiting Neg Hx     Emergence Delirium Neg Hx     Post-op Cognitive Dysfunction Neg Hx        Social History     Social History    Marital status:      Spouse name: N/A    Number of children: N/A    Years of education: N/A     Occupational History    Not on file.      Social History Main Topics    Smoking status: Former Smoker     Packs/day: 1.00     Quit date: 6/9/1986    Smokeless tobacco: Never Used    Alcohol use No    Drug use: No    Sexual activity: Not on file Other Topics Concern    Not on file     Social History Narrative         ALLERGIES: Morphine sulfate    Review of Systems   Constitutional: Negative for activity change, chills and fever. HENT: Positive for congestion. Negative for nosebleeds, sore throat, trouble swallowing and voice change. Eyes: Negative for visual disturbance. Respiratory: Positive for cough. Negative for shortness of breath. Cardiovascular: Negative for chest pain and palpitations. Gastrointestinal: Negative for abdominal pain, constipation, diarrhea and nausea. Genitourinary: Negative for difficulty urinating, dysuria, hematuria and urgency. Musculoskeletal: Positive for back pain. Negative for neck pain and neck stiffness. Skin: Negative for color change. Allergic/Immunologic: Negative for immunocompromised state. Neurological: Negative for dizziness, seizures, syncope, weakness, light-headedness, numbness and headaches. Psychiatric/Behavioral: Negative for behavioral problems, confusion, hallucinations, self-injury and suicidal ideas. Vitals:    12/13/17 1204 12/13/17 1205   BP:  146/43   Pulse:  (!) 59   Resp:  14   Temp: 97.8 °F (36.6 °C) 98.7 °F (37.1 °C)   SpO2:  97%   Weight:  90.7 kg (200 lb)   Height:  5' 8\" (1.727 m)            Physical Exam   Constitutional: He is oriented to person, place, and time. No distress. Pt is chronically ill-appearing. Afebrile. HENT:   Head: Normocephalic and atraumatic. Eyes: Pupils are equal, round, and reactive to light. Neck: Normal range of motion. Neck supple. Cardiovascular: Normal rate, regular rhythm and normal heart sounds. Exam reveals no gallop and no friction rub. No murmur heard. Pulmonary/Chest: Effort normal and breath sounds normal. No respiratory distress. He has no wheezes. Abdominal: Soft. Bowel sounds are normal. He exhibits distension. There is no tenderness. There is no rebound and no guarding.    Musculoskeletal: Normal range of motion. Neurological: He is alert and oriented to person, place, and time. Skin: Skin is warm. No rash noted. He is not diaphoretic. Psychiatric: He has a normal mood and affect. His behavior is normal. Judgment and thought content normal.   Nursing note and vitals reviewed. Note written by Rupert Messina. Albinaadi Radha, as dictated by Conrado Wisdom MD 1:09 PM        [facility]  ED Course   This is a 67-year-old male with past medical history, review of systems, physical exam as above tenting with complaint of acute on chronic back pain, and microscopic hematuria. Family physician has been evaluating the patient's urine, called family yesterday to report microscopic hematuria, the context of worsening back pain. Patient with no previous history of UTI, or kidney stones, however does has a history of BPH. Patient said bilateral lumbar back pain, with radiation to the right hip, is worse than usual, refractory to his oral medications at home. He denies fevers, chills, nausea, vomiting, chest pain or shortness of breath, he denies visible hematuria. Physical exam remarkable for chronically ill-appearing male, in no acute distress, with soft distended nontender abdomen, clear breath sounds, noted be afebrile, not tachycardic. Suspect microscopic hematuria of unknown significance, however given back pain we'll obtain CMP, CBC, UA, renal colic CT, provide pain control and fluid bolus. We will be disposition based on the patient's diagnostics and response to therapy. Procedures    1:57 PM  Patient renal fx largely unchanged, microscopic hematuria, CT A/P w/o acute changes, DDD noted. Will DC home to f/u with Urology as scheduled tomorrow.

## 2017-12-15 NOTE — ED NOTES
Spoke with [facility], reported death to [facility], will call in 1 hour, patient is a hold for tissue only as far as donation

## 2017-12-15 NOTE — PROGRESS NOTES
Spiritual Care Assessment/Progress Notes    Meño Ashraf [id number]  [id number]    1940  65 y.o.  male    Patient Telephone Number: [phone] (home)   Worship Affiliation: Caodaism   Language: English   Extended Emergency Contact Information  Primary Emergency Contact: [address] Phone: [phone]  Mobile Phone: [phone]  Relation: Spouse   Patient Active Problem List    Diagnosis Date Noted    DM type 2 causing renal disease ([facility] 11/09/2012    CKD (chronic kidney disease), stage III 11/09/2012    Debility 11/09/2012    Venous insufficiency 10/31/2012    Anemia, unspecified 09/28/2012    COPD (chronic obstructive pulmonary disease) ([facility] 08/14/2012    NICHOLE (obstructive sleep apnea) 08/14/2012    Cervical spondylosis with myelopathy 07/01/2011    Hypertension 03/29/2011    Dyslipidemia 03/29/2011    Degenerative joint disease 03/29/2011        Date: 12/15/2017       Level of Worship/Spiritual Activity:  []         Involved in rohan tradition/spiritual practice    []         Not involved in rohan tradition/spiritual practice  []         Spiritually oriented    []         Claims no spiritual orientation    []         seeking spiritual identity  []         Feels alienated from Moravian practice/tradition  []         Feels angry about Moravian practice/tradition  []         Spirituality/Moravian tradition a resource for coping at this time.   [x]         Not able to assess due to medical condition    Services Provided Today per family:  [x]         crisis intervention    []         reading Scriptures  [x]         spiritual assessment    []         prayer  [x]         empathic listening/emotional support  []         rites and rituals (cite in comments)  []         life review     [x]         Moravian support  []         theological development    [x]         advocacy  []         ethical dialog     []         blessing  [x]         bereavement support    [x]         support to family  []         anticipatory grief support   []         help with AMD  []         spiritual guidance    []         meditation      Spiritual Care Needs  []         Emotional Support  [x]         Spiritual/Anabaptism Care  [x]         Loss/Adjustment  []         Advocacy/Referral                /Ethics  []         No needs expressed at               this time  []         Other: (note in               comments)  [address]  []         Follow up visits with               pt/family  []         Provide materials  []         Schedule sacraments  []         Contact Community               Clergy  [x]         Follow up as needed  []         Other: (note in               comments)     Comments: Responded to call from ED nursing staff that patient was arriving in cardiac arrest and family was present. Connected with patient's wife and son in ED waiting room who indicated that patient was as DNR. Communicated family's and patient's wishes with medical team and provided supportive presence as family was informed of patient's death. When appropriate escorted family to patient's room and facilitated processing of emotions, life review, and story telling. Consulted with medical team regarding patient's Adalberto Lord and the importance of timely burial to the family. Worked with family, staff, and family's  home in [place] to make arrangements and respect family's rohan traditions and beliefs. Continued to provide support until family reached a place of self-supporting and assured of  availability as needed and desired. JUVENAL [employer]    Paging Service [phone] ([phone])

## 2017-12-15 NOTE — ED NOTES
Called [facility] to advise that the patient will be transported to the  home asa due to [facility] reasons, and that we need to release the body.   Notified Candice Blount

## 2017-12-15 NOTE — ED PROVIDER NOTES
HPI Comments: 65 y.o. male with extensive past medical history, please see list, significant for peripheral neuropathy, hyperlipidemia, hypertension, COPD, GERD, anxiety, depression, DDD, cardiac arrest, and chronic kidney disease who presents from home via EMS with chief complaint of cardiac arrest. EMS were called at 1300 since the patient had shortness of breath. Patient was found not breathing at 1315 approximately 1 hour ago, and CPR was started between 1315 and 1330 by the family. EMS found the patient with asystole upon arrival. They gave the patient 2 rounds of epi en route to the hospital and placed an IO and ET tube. No pulses were found en route to the hospital per EMS. Social hx: Tobacco Use: No (former smoker), Alcohol Use: No, Drug Use: No    PCP: Elva Daley MD    Note written by Braulio Crowder, as dictated by Mayra Da Silva. Brigette Cee MD 2:18 PM            The history is provided by the EMS personnel and medical records. The history is limited by the condition of the patient (Full arrest). Past Medical History:   Diagnosis Date    Anxiety and depression     pain related    BPH (benign prostatic hyperplasia)     Cardiac arrest ([facility] [address]     S/p dav-asystolic arrest due to CO2 retention  8/12    Chronic kidney disease     COPD (chronic obstructive pulmonary disease) (HCC)     mild    DDD (degenerative disc disease), cervical     DDD (degenerative disc disease), thoracolumbar     DJD (degenerative joint disease)     hips, shoulders    GERD (gastroesophageal reflux disease)     H/O Clostridium difficile infection     History of non-ST elevation myocardial infarction (NSTEMI)     Post op NSTEMI- supply demand-EF nl    Hx of transfusion of whole blood 7/2011    ST[address]  [address], NO REACTION    Hyperlipidemia     Hypertension     NICHOLE (obstructive sleep apnea)     refuses CPAP    Peripheral neuropathy     Pneumonia     Unspecified sleep apnea     sleeps with a CPAP    Urinary retention        Past Surgical History:   Procedure Laterality Date    HX ADENOIDECTOMY      AS A CHILD    HX CERVICAL FUSION  7/2011    C2-C3 SCAR CLEAN UP, TERENCE EDDIE    HX CERVICAL LAMINECTOMY  2005    DECOMPRESSIVE CERVIVAL LAMINECTOMY C1-C7    HX GI      COLONOSOCPY    HX HEENT      wisdom teeth    HX HERNIA REPAIR      UMBILICAL    HX LUMBAR LAMINECTOMY  2004    L4-L5 AND DECOMPRESSION L1-S1    HX ORTHOPAEDIC  2004    Thoracic laminectomy    HX ORTHOPAEDIC  7/2011    C1-C2 Decompressive laminectomy     HX ORTHOPAEDIC  2012    hip replacement, right    HX SHOULDER ARTHROSCOPY  10/17/2009    RT. SHOULDER    HX SHOULDER REPLACEMENT  1991, 1993, 2010    bilat shoulderREPLACEMNET 2X EACH SHOULDER    HX THORACIC LAMINECTOMY  2004    T9-T12 W/DECOMPRESSION OF SPINAL CORD    HX TONSILLECTOMY      AS A CHILD         Family History:   Problem Relation Age of Onset    Cancer Mother      PANCREATIC    Hypertension Father     Stroke Father      CREBRAL HEMORRHAGE    Other Brother      paraplegic   [facility] Arthritis-rheumatoid Daughter     Malignant Hyperthermia Neg Hx     Pseudocholinesterase Deficiency Neg Hx     Delayed Awakening Neg Hx     Post-op Nausea/Vomiting Neg Hx     Emergence Delirium Neg Hx     Post-op Cognitive Dysfunction Neg Hx        Social History     Social History    Marital status:      Spouse name: N/A    Number of children: N/A    Years of education: N/A     Occupational History    Not on file.      Social History Main Topics    Smoking status: Former Smoker     Packs/day: 1.00     Quit date: 6/9/1986    Smokeless tobacco: Never Used    Alcohol use No    Drug use: No    Sexual activity: Not on file     Other Topics Concern    Not on file     Social History Narrative         ALLERGIES: Morphine sulfate    Review of Systems   Unable to perform ROS: Acuity of condition (full arrest)       Vitals:    12/15/17 1430 12/15/17 1431   Pulse: (!) 50 (!) 33 Physical Exam   Constitutional:   Unresponsive, intubated, CPR in progress   HENT:   ET tube in place at 24 at the lip   Eyes:   Pupils fixed and dilated   Neck: No tracheal deviation present. Cardiovascular:   No pulses. No heart sounds ausculated   Pulmonary/Chest:   Equal breath sounds with bag ventilation   Abdominal: He exhibits distension. Musculoskeletal: He exhibits no deformity. Neurological:   unresponsive   Skin:   Cool, dry   Psychiatric:   Unable to assess       MDM  Number of Diagnoses or Management Options  Cardiac arrest [facility]):   Diagnosis management comments: 50-year-old male with history of chronic kidney disease, CAD, prior cardiac arrest which was secondary to CO2 retention presents in cardiac arrest after an episode of shortness of breath. Total down time was over an hour. He arrived in asystole. While CPR was performed, the hospital  came to notify us that he is a DNR. Resuscitation efforts were discontinued. The patient had a hard he received multiple rounds of epinephrine, bicarbonate, calcium. He remained in PEA or asystole. He was pronounced dead at 1430. I discussed his medical care with the family. Critical Care  Total time providing critical care: (Total critical care time spent exclusive of procedures: 38 minutes  )    ED Course       Bedside US  Date/Time: 12/15/2017 5:56 PM  Consent: The procedure was performed in an emergent situation. Procedure Type: Abdominal and Cardiac  Indication: cardiac arrest.  Findings: No cardiac activity, ? dilated aorta with flap. Confirmation Study: Not Indicated        2:19 PM  Epinephrine 1 mg given. 2:20 PM  Pulse check. No pulse felt and CPR was resumed. 2:23 PM  Epinephrine 1 mg given. 2:23 PM  Pulse check. No pulse felt and CPR was resumed. 2:23 PM  Bicarb given. 2:27 PM  Wife arrived to ED and notes patient is DNR. CPR was immediately stopped. 2:30 PM  Patient was pronounced dead.

## 2017-12-15 NOTE — ED NOTES
Arrives via EMS as full cardiac arrest. Patient intubated, CPR in progress, with IO to right tib fib. No spontaneous respirations. Pulseless upon EMS arrival. Patient told wife that he was SOB at 56, wife checked on him at 18 and found him unresponsive, CPR initiated by family, PD providing CPR upon EMS arrival to home. Two rounds of epi given en route, patient remained asystole.

## 2017-12-15 NOTE — ED NOTES
Wife arrives to ED and reports that the patient is a DNR, resuscitation ceased. Patient now with faint pulse, rate of 50.
